# Patient Record
Sex: FEMALE | Race: OTHER | HISPANIC OR LATINO | Employment: FULL TIME | URBAN - METROPOLITAN AREA
[De-identification: names, ages, dates, MRNs, and addresses within clinical notes are randomized per-mention and may not be internally consistent; named-entity substitution may affect disease eponyms.]

---

## 2018-02-07 ENCOUNTER — OFFICE VISIT (OUTPATIENT)
Dept: OBGYN CLINIC | Facility: CLINIC | Age: 33
End: 2018-02-07
Payer: COMMERCIAL

## 2018-02-07 VITALS
WEIGHT: 139 LBS | DIASTOLIC BLOOD PRESSURE: 73 MMHG | SYSTOLIC BLOOD PRESSURE: 109 MMHG | HEIGHT: 67 IN | BODY MASS INDEX: 21.82 KG/M2

## 2018-02-07 DIAGNOSIS — Z30.433 ENCOUNTER FOR REMOVAL AND REINSERTION OF IUD: Primary | ICD-10-CM

## 2018-02-07 LAB — SL AMB POCT URINE HCG: NORMAL

## 2018-02-07 PROCEDURE — 58300 INSERT INTRAUTERINE DEVICE: CPT | Performed by: OBSTETRICS & GYNECOLOGY

## 2018-02-07 PROCEDURE — 58301 REMOVE INTRAUTERINE DEVICE: CPT | Performed by: OBSTETRICS & GYNECOLOGY

## 2018-02-07 PROCEDURE — 81025 URINE PREGNANCY TEST: CPT | Performed by: OBSTETRICS & GYNECOLOGY

## 2018-02-07 PROCEDURE — 99203 OFFICE O/P NEW LOW 30 MIN: CPT | Performed by: OBSTETRICS & GYNECOLOGY

## 2018-02-07 PROCEDURE — 84703 CHORIONIC GONADOTROPIN ASSAY: CPT | Performed by: OBSTETRICS & GYNECOLOGY

## 2018-02-07 NOTE — PROGRESS NOTES
Iud insertions  Date/Time: 2/7/2018 4:39 PM  Performed by: Tammie King  Authorized by: Tammie King     Consent:     Consent obtained:  Written    Consent given by:  Patient    Procedure risks and benefits discussed: yes      Patient questions answered: yes      Patient agrees, verbalizes understanding, and wants to proceed: yes      Instructions and paperwork completed: yes    Universal protocol:     Patient states understanding of procedure being performed: yes      Relevant documents present and verified: yes (Prior Mirena card reviewed)      Required blood products, implants, devices, and special equipment available: yes    Procedure:     Pelvic exam performed: yes      Negative urine pregnancy test: yes      Cervix cleaned and prepped: yes (betadine x3)      Speculum placed in vagina: yes      Tenaculum applied to cervix: yes      IUD inserted with no complications: yes      IUD type:  Mirena (Lot No: Viktoria Mark)    Strings trimmed: yes      Uterus sounded to confirm IUD placement: no      Uterus sound depth (cm):  7  Post-procedure:     Patient tolerated procedure well: yes

## 2018-02-07 NOTE — PROGRESS NOTES
Iud removal  Date/Time: 2018 4:38 PM  Performed by: Bell Villegas  Authorized by: Bell Villegas     Consent:     Consent obtained:  Written    Consent given by:  Patient    Procedure risks and benefits discussed: yes      Patient questions answered: yes      Patient agrees, verbalizes understanding, and wants to proceed: yes      Instructions and paperwork completed: yes    Universal protocol:     Patient states understanding of procedure being performed: yes      Relevant documents present and verified: yes (reviewed patient's Mirena card)      Required blood products, implants, devices, and special equipment available: yes    Procedure:     Removed with no complications: yes      Other reason for removal:   IUD

## 2018-02-07 NOTE — PROGRESS NOTES
Assessment:  33 y o  Ayla Stacy presenting for removal and reinsertion of Mirena IUD due to  IUD  Completed without difficulty  Plan:  Diagnoses and all orders for this visit:    Encounter for removal and reinsertion of IUD  -     levonorgestrel (MIRENA) 20 MCG/24HR IUD; by Intrauterine route once   -     POCT urine HCG  -     Return to office in 2 mo for annual and string check          __________________________________________________________________    Subjective   Paul David is a 33 y o  Ayla Caroy with amenorrhea who is sexually active and currently using contraception (Mirena) presenting for removal and reinsertion of Mirena IUD due to  device  She is without complaints  Patient reports she had the Mirena inserted by [facility] in 2012  She notes since this time, her periods have been largely absent  She sometimes gets spotting, but rarely  She does not have cramping  She is very satisfied with this method of contraception and wishes to continue with it  The patient has no complaints today  She denies fever/chills, abdominal pains, vaginal discharge, vaginal or labial itching/irritation, or urinary complaints  Reviewed with patient expected outcomes and bleeding patterns  Reviewed risks and benefits of Mirena insertion  The risks include, but are not limited to, bleeding, infection, injury to surrounding structures, uterine perforation, expulsion, failure resulting in pregnancy, and the higher relative risk of ectopic pregnancy  Reviewed risk of inability to remove her old Mirena in office  Patient expressed understanding and had no additional questions  The following portions of the patient's history were reviewed and updated as appropriate: allergies, current medications, past family history, past medical history, past social history, past surgical history and problem list     Review of Systems  Pertinent items are noted in HPI         Objective  /73  5' 7" (1 702 m)   Wt 63 kg (139 lb)   Breastfeeding? No Comment: iud 12/2012  BMI 21 77 kg/m²      Physical Exam:  Physical Exam   Constitutional: She is oriented to person, place, and time  She appears well-developed and well-nourished  No distress  HENT:   Head: Normocephalic and atraumatic  Eyes: No scleral icterus  Cardiovascular: Normal rate  Pulmonary/Chest: Effort normal  No accessory muscle usage  No respiratory distress  Genitourinary: There is no rash, tenderness, lesion or injury on the right labia  There is no rash, tenderness, lesion or injury on the left labia  Uterus is not enlarged, not fixed (anteverted, sounds to 7cm) and not tender  Cervix exhibits no motion tenderness, no discharge and no friability  No erythema, tenderness or bleeding in the vagina  No signs of injury around the vagina  No vaginal discharge found  Musculoskeletal: She exhibits no edema  Neurological: She is alert and oriented to person, place, and time  Skin: Skin is warm and dry  No rash noted  No erythema  Psychiatric: She has a normal mood and affect   Her behavior is normal          Lab Review  Labs: HCG - qualitative: NEG

## 2018-02-08 NOTE — PROGRESS NOTES
I have reviewed the notes, assessments, and/or procedures performed by Dr Bridget Trinh, I concur with her/his documentation of Santiago Valverde

## 2018-04-11 ENCOUNTER — OFFICE VISIT (OUTPATIENT)
Dept: OBGYN CLINIC | Facility: CLINIC | Age: 33
End: 2018-04-11
Payer: COMMERCIAL

## 2018-04-11 VITALS
WEIGHT: 140 LBS | SYSTOLIC BLOOD PRESSURE: 114 MMHG | HEIGHT: 67 IN | DIASTOLIC BLOOD PRESSURE: 71 MMHG | BODY MASS INDEX: 21.97 KG/M2

## 2018-04-11 DIAGNOSIS — Z30.431 IUD CHECK UP: ICD-10-CM

## 2018-04-11 DIAGNOSIS — N76.0 BACTERIAL VAGINOSIS: Primary | ICD-10-CM

## 2018-04-11 DIAGNOSIS — B96.89 BACTERIAL VAGINOSIS: Primary | ICD-10-CM

## 2018-04-11 LAB
BV WHIFF TEST VAG QL: POSITIVE
CLUE CELLS SPEC QL WET PREP: POSITIVE
PH SMN: 5.5 [PH]
SL AMB POCT WET MOUNT: ABNORMAL
T VAGINALIS RRNA SPEC QL NAA+PROBE: NEGATIVE
YEAST VAG QL WET PREP: NEGATIVE

## 2018-04-11 PROCEDURE — 87210 SMEAR WET MOUNT SALINE/INK: CPT | Performed by: NURSE PRACTITIONER

## 2018-04-11 PROCEDURE — 99213 OFFICE O/P EST LOW 20 MIN: CPT | Performed by: NURSE PRACTITIONER

## 2018-04-11 RX ORDER — METRONIDAZOLE 500 MG/1
500 TABLET ORAL EVERY 12 HOURS SCHEDULED
Qty: 14 TABLET | Refills: 0 | Status: SHIPPED | OUTPATIENT
Start: 2018-04-11 | End: 2018-04-18

## 2018-04-11 NOTE — PROGRESS NOTES
Assessment/Plan:         Diagnoses and all orders for this visit:    Bacterial vaginosis  -     metroNIDAZOLE (FLAGYL) 500 mg tablet; Take 1 tablet (500 mg total) by mouth every 12 (twelve) hours for 7 days  -     POCT wet mount    IUD check up    Other orders  -     levonorgestrel (MIRENA) 20 MCG/24HR IUD; 20 mcg/day      Plan  Take Metronidazole as directed '  Call with needs or concerns  Schedule annual GYN exam  Pt verbalized understanding of all discussed  Subjective:      Patient ID: Cornell Morse is a 33 y o  female  HPI   Pt had a new Mirena inserted 1 month ago presents for IUD check  Notes intermittent light pink and dark blood  Also notes an intermittent odor  Explained wet mount was positive for BV, safe and effective use of Metronidazole provided    The following portions of the patient's history were reviewed and updated as appropriate: allergies, current medications, past family history, past medical history, past social history, past surgical history and problem list     Review of Systems    Negative except for intermittent vaginal odor    Objective:      /71   Ht 5' 7" (1 702 m)   Wt 63 5 kg (140 lb)   BMI 21 93 kg/m²          Physical Exam    Alert and orinted  Vulva negative lesions or erythema  Vagina small amt   of dark blood  Cervix small amount of dark blood  Wet positive for BV

## 2018-04-24 ENCOUNTER — OFFICE VISIT (OUTPATIENT)
Dept: OBGYN CLINIC | Facility: CLINIC | Age: 33
End: 2018-04-24
Payer: COMMERCIAL

## 2018-04-24 VITALS
DIASTOLIC BLOOD PRESSURE: 69 MMHG | SYSTOLIC BLOOD PRESSURE: 105 MMHG | HEART RATE: 75 BPM | WEIGHT: 138 LBS | HEIGHT: 67 IN | BODY MASS INDEX: 21.66 KG/M2

## 2018-04-24 DIAGNOSIS — Z01.419 ENCOUNTER FOR ANNUAL ROUTINE GYNECOLOGICAL EXAMINATION: Primary | ICD-10-CM

## 2018-04-24 PROCEDURE — G0124 SCREEN C/V THIN LAYER BY MD: HCPCS | Performed by: PATHOLOGY

## 2018-04-24 PROCEDURE — 99395 PREV VISIT EST AGE 18-39: CPT | Performed by: NURSE PRACTITIONER

## 2018-04-24 PROCEDURE — 87624 HPV HI-RISK TYP POOLED RSLT: CPT | Performed by: NURSE PRACTITIONER

## 2018-04-24 PROCEDURE — G0145 SCR C/V CYTO,THINLAYER,RESCR: HCPCS | Performed by: PATHOLOGY

## 2018-04-24 NOTE — PROGRESS NOTES
Assessment             Plan      All questions answered  Breast self exam technique reviewed and patient encouraged to perform self-exam monthly  Contraception: IUD  Discussed healthy lifestyle modifications  Follow up in 1 year  Thin prep Pap smear  PAP results will be available in 2 weeks  Call with needs or concerns  Return in 1 year  Pt verbalized understanding of all discussed  Giovana Pelayo is a 33 y o  female who presents for annual exam  Periods are not occurring with Mirena, lasting 0 days  Dysmenorrhea:none  Cyclic symptoms include none  No intermenstrual bleeding, spotting, or discharge  The patient reports that there is not domestic violence in her life  1 partner x 4 years  Current contraception: IUD  History of abnormal Pap smear: no, last was > 2 years ago  Family history of uterine or ovarian cancer: no  Regular self breast exam: no discussed importance  History of abnormal mammogram: N/A  Family history of breast cancer: no  History of abnormal lipids: unknown  Menstrual History:  OB History      Para Term  AB Living    1 1 1     1    SAB TAB Ectopic Multiple Live Births            1         Menarche age: 16  No LMP recorded  Patient is not currently having periods (Reason: Birth Control)  Period Duration (Days): mirena    The following portions of the patient's history were reviewed and updated as appropriate: allergies, current medications, past family history, past medical history, past social history, past surgical history and problem list     Review of Systems  Pertinent items are noted in HPI        Objective      /69   Pulse 75   Ht 5' 7" (1 702 m)   Wt 62 6 kg (138 lb)   BMI 21 61 kg/m²     General:   alert and oriented, in no acute distress, alert, appears stated age and cooperative   Heart: regular rate and rhythm, S1, S2 normal, no murmur, click, rub or gallop   Lungs: clear to auscultation bilaterally   Thyroid negative masses Abdomen: soft, non-tender, without masses or organomegaly   Vulva: normal   Vagina: normal mucosa   Cervix: no bleeding following Pap, no cervical motion tenderness and no lesions, IUD string noted at cervical os   Uterus: normal size, normal shape, normal consistency  Brreasts   Adnexa: normal adnexa   Breasts NT, negative discharge, dimpling or masses

## 2018-04-27 LAB — HPV RRNA GENITAL QL NAA+PROBE: NORMAL

## 2018-05-01 ENCOUNTER — TELEPHONE (OUTPATIENT)
Dept: OBGYN CLINIC | Facility: CLINIC | Age: 33
End: 2018-05-01

## 2018-05-01 PROBLEM — R87.612 LGSIL ON PAP SMEAR OF CERVIX: Status: ACTIVE | Noted: 2018-05-01

## 2018-05-01 LAB
LAB AP GYN PRIMARY INTERPRETATION: NORMAL
Lab: NORMAL
PATH INTERP SPEC-IMP: NORMAL

## 2018-05-01 NOTE — TELEPHONE ENCOUNTER
Spoke to patient on the phone regarding pap results, she is to come back in one year for a repeat pap

## 2018-06-13 ENCOUNTER — OFFICE VISIT (OUTPATIENT)
Dept: OBGYN CLINIC | Facility: CLINIC | Age: 33
End: 2018-06-13

## 2018-06-13 VITALS
DIASTOLIC BLOOD PRESSURE: 66 MMHG | HEIGHT: 67 IN | HEART RATE: 56 BPM | WEIGHT: 137 LBS | SYSTOLIC BLOOD PRESSURE: 101 MMHG | BODY MASS INDEX: 21.5 KG/M2

## 2018-06-13 DIAGNOSIS — B96.89 BACTERIAL VAGINOSIS: Primary | ICD-10-CM

## 2018-06-13 DIAGNOSIS — N76.0 BACTERIAL VAGINOSIS: Primary | ICD-10-CM

## 2018-06-13 PROCEDURE — 87210 SMEAR WET MOUNT SALINE/INK: CPT | Performed by: NURSE PRACTITIONER

## 2018-06-13 PROCEDURE — 99214 OFFICE O/P EST MOD 30 MIN: CPT | Performed by: NURSE PRACTITIONER

## 2018-06-13 RX ORDER — METRONIDAZOLE 500 MG/1
500 TABLET ORAL EVERY 12 HOURS SCHEDULED
Qty: 14 TABLET | Refills: 0 | Status: SHIPPED | OUTPATIENT
Start: 2018-06-13 | End: 2018-06-20

## 2018-06-13 NOTE — PATIENT INSTRUCTIONS
Take Metronidazole as directed   Annual exam PAP with HPV co-testing is due 4/2019  Call wt needs or concerns

## 2018-06-13 NOTE — PROGRESS NOTES
Assessment/Plan:  1  Bacterial vaginosis  POCT wet mount    metroNIDAZOLE (FLAGYL) 500 mg tablet          Diagnoses and all orders for this visit:    Bacterial vaginosis  -     POCT wet mount  -     metroNIDAZOLE (FLAGYL) 500 mg tablet; Take 1 tablet (500 mg total) by mouth every 12 (twelve) hours for 7 days      Plan  Take Metronidazole as directed   Annual exam PAP with HPV co-testing is due 4/2019  Call [facility] needs or concerns  Pt verbalized understanding of all discussed  Subjective:      Patient ID: Jerzy Rios is a 33 y o  female  HPI   Pt presents today with C/O vaginal discharge with an odor x several days  1 partner x 4 years    Explained wet mount was positive for BV, safe and effective use of Metronidazole provided    The following portions of the patient's history were reviewed and updated as appropriate: allergies, current medications, past family history, past medical history, past social history, past surgical history and problem list     Review of Systems    Negative except for vaginal discharge with an odor    Objective:      /66   Pulse 56   Ht 5' 6 53" (1 69 m)   Wt 62 1 kg (137 lb)   BMI 21 76 kg/m²          Physical Exam    Vulva negative lesions or erythema  Vagina positive thin gray discharge  Cervix negative lesionspositive tin gray discharge  Wet mount positive for BV

## 2019-04-16 ENCOUNTER — TELEPHONE (OUTPATIENT)
Dept: OBGYN CLINIC | Facility: CLINIC | Age: 34
End: 2019-04-16

## 2019-05-10 ENCOUNTER — TELEPHONE (OUTPATIENT)
Dept: OBGYN CLINIC | Facility: CLINIC | Age: 34
End: 2019-05-10

## 2021-09-09 ENCOUNTER — APPOINTMENT (EMERGENCY)
Dept: RADIOLOGY | Facility: HOSPITAL | Age: 36
End: 2021-09-09

## 2021-09-09 ENCOUNTER — HOSPITAL ENCOUNTER (EMERGENCY)
Facility: HOSPITAL | Age: 36
Discharge: HOME | End: 2021-09-09
Attending: EMERGENCY MEDICINE | Admitting: EMERGENCY MEDICINE

## 2021-09-09 VITALS
SYSTOLIC BLOOD PRESSURE: 136 MMHG | RESPIRATION RATE: 18 BRPM | TEMPERATURE: 99 F | OXYGEN SATURATION: 94 % | BODY MASS INDEX: 22.72 KG/M2 | DIASTOLIC BLOOD PRESSURE: 66 MMHG | WEIGHT: 143.08 LBS | HEART RATE: 113 BPM

## 2021-09-09 DIAGNOSIS — J06.9 UPPER RESPIRATORY INFECTION: Primary | ICD-10-CM

## 2021-09-09 LAB — SARS-COV-2 RNA RESP QL NAA+PROBE: NEGATIVE

## 2021-09-09 PROCEDURE — 71045 X-RAY EXAM CHEST 1 VIEW: CPT

## 2021-09-09 PROCEDURE — U0003 INFECTIOUS AGENT DETECTION BY NUCLEIC ACID (DNA OR RNA); SEVERE ACUTE RESPIRATORY SYNDROME CORONAVIRUS 2 (SARS-COV-2) (CORONAVIRUS DISEASE [COVID-19]), AMPLIFIED PROBE TECHNIQUE, MAKING USE OF HIGH THROUGHPUT TECHNOLOGIES AS DESCRIBED BY CMS-2020-01-R: HCPCS | Performed by: EMERGENCY MEDICINE

## 2021-09-09 PROCEDURE — 99283 EMERGENCY DEPT VISIT LOW MDM: CPT

## 2021-09-09 PROCEDURE — 99283 EMERGENCY DEPT VISIT LOW MDM: CPT | Performed by: EMERGENCY MEDICINE

## 2021-09-09 PROCEDURE — U0005 INFEC AGEN DETEC AMPLI PROBE: HCPCS | Performed by: EMERGENCY MEDICINE

## 2021-09-09 RX ORDER — ALBUTEROL SULFATE 90 UG/1
2 AEROSOL, METERED RESPIRATORY (INHALATION) ONCE
Status: COMPLETED | OUTPATIENT
Start: 2021-09-09 | End: 2021-09-09

## 2021-09-09 RX ORDER — FEXOFENADINE HCL AND PSEUDOEPHEDRINE HCI 60; 120 MG/1; MG/1
1 TABLET, EXTENDED RELEASE ORAL EVERY 12 HOURS
Qty: 20 TABLET | Refills: 0 | Status: SHIPPED | OUTPATIENT
Start: 2021-09-09

## 2021-09-09 RX ORDER — IBUPROFEN 600 MG/1
600 TABLET ORAL ONCE
Status: COMPLETED | OUTPATIENT
Start: 2021-09-09 | End: 2021-09-09

## 2021-09-09 RX ORDER — FLUTICASONE PROPIONATE 50 MCG
1 SPRAY, SUSPENSION (ML) NASAL DAILY
Qty: 16 G | Refills: 0 | Status: SHIPPED | OUTPATIENT
Start: 2021-09-09

## 2021-09-09 RX ORDER — IBUPROFEN 600 MG/1
600 TABLET ORAL EVERY 6 HOURS PRN
Qty: 20 TABLET | Refills: 0 | Status: SHIPPED | OUTPATIENT
Start: 2021-09-09 | End: 2022-01-10 | Stop reason: SDUPTHER

## 2021-09-09 RX ADMIN — IBUPROFEN 600 MG: 600 TABLET, FILM COATED ORAL at 17:30

## 2021-09-09 RX ADMIN — ALBUTEROL SULFATE 2 PUFF: 90 AEROSOL, METERED RESPIRATORY (INHALATION) at 17:31

## 2021-09-09 NOTE — Clinical Note
Luz Maria Leon was seen and treated in our emergency department on 9/9/2021  Diagnosis:     Martha    She may return on this date:     [address] TEST RESULT  PATIENT MAY RETURN TO WORK/SCHOOL WHEN PATIENT IS 3 DAYS WITHOUT SYMPTOMS  If you have any questions or concerns, please don't hesitate to call        Oswaldo Calzada PA-C    ______________________________           _______________          _______________  Hospital Representative                              Date                                Time

## 2021-09-09 NOTE — ED PROVIDER NOTES
History  Chief Complaint   Patient presents with    Cough     cough, fever, states breathing "not normal" symptoms started last night  denies sick contacts   80-year-old female with cough, fever and feeling shortness of breath since last night  No sick contacts  Prior to Admission Medications   Prescriptions Last Dose Informant Patient Reported? Taking?   levonorgestrel (MIRENA) 20 MCG/24HR IUD   Yes No   Si mcg/day see administration instructions        Facility-Administered Medications: None       Past Medical History:   Diagnosis Date    Bacterial vaginosis 2018       History reviewed  No pertinent surgical history  Family History   Problem Relation Age of Onset    Diabetes Mother     No Known Problems Father      I have reviewed and agree with the history as documented  E-Cigarette/Vaping    E-Cigarette Use Never User      E-Cigarette/Vaping Substances     Social History     Tobacco Use    Smoking status: Never Smoker    Smokeless tobacco: Never Used   Vaping Use    Vaping Use: Never used   Substance Use Topics    Alcohol use: Yes     Comment: socially    Drug use: No       Review of Systems   Constitutional: Positive for fever  Negative for appetite change and fatigue  HENT: Negative for rhinorrhea and sore throat  Eyes: Negative for pain  Respiratory: Positive for cough and shortness of breath  Cardiovascular: Negative for chest pain and leg swelling  Gastrointestinal: Negative for abdominal pain, diarrhea and vomiting  Genitourinary: Negative for dysuria and flank pain  Musculoskeletal: Negative for back pain and neck pain  Skin: Negative for rash  Neurological: Negative for syncope and headaches  Psychiatric/Behavioral:        Mood normal       Physical Exam  Physical Exam  Vitals and nursing note reviewed  Constitutional:       Appearance: She is well-developed  HENT:      Head: Normocephalic and atraumatic     Eyes:      Pupils: Pupils are equal, round, and reactive to light  Cardiovascular:      Rate and Rhythm: Normal rate and regular rhythm  Pulmonary:      Effort: Pulmonary effort is normal  No respiratory distress  Breath sounds: Normal breath sounds  No wheezing  Abdominal:      Palpations: Abdomen is soft  Tenderness: There is no abdominal tenderness  Musculoskeletal:         General: Normal range of motion  Cervical back: Normal range of motion and neck supple  Skin:     General: Skin is warm and dry  Neurological:      Mental Status: She is alert and oriented to person, place, and time  Vital Signs  ED Triage Vitals [09/09/21 1439]   Temperature Pulse Respirations Blood Pressure SpO2   99 °F (37 2 °C) (!) 113 18 136/66 94 %      Temp Source Heart Rate Source Patient Position - Orthostatic VS BP Location FiO2 (%)   Oral Monitor -- Right arm --      Pain Score       No Pain           Vitals:    09/09/21 1439   BP: 136/66   Pulse: (!) 113         Visual Acuity      ED Medications  Medications   albuterol (PROVENTIL HFA,VENTOLIN HFA) inhaler 2 puff (2 puffs Inhalation Given 9/9/21 1731)   ibuprofen (MOTRIN) tablet 600 mg (600 mg Oral Given 9/9/21 1730)       Diagnostic Studies  Results Reviewed     Procedure Component Value Units Date/Time    Novel Coronavirus (Covid-19),PCR [facility] - 24 Hour Routine [[id number]]  (Normal) Collected: 09/09/21 1730    Lab Status: Final result Specimen: Nares from Nose Updated: 09/09/21 1858     SARS-CoV-2 Negative    Narrative: The specimen collection materials, transport medium, and/or testing methodology utilized in the production of these test results have been proven to be reliable in a limited validation with an abbreviated program under the Emergency Utilization Authorization provided by the FDA  Testing reported as "Presumptive positive" will be confirmed with secondary testing to ensure result accuracy    Clinical caution and judgement should be used with the interpretation of these results with consideration of the clinical impression and other laboratory testing  Testing reported as "Positive" or "Negative" has been proven to be accurate according to standard laboratory validation requirements  All testing is performed with control materials showing appropriate reactivity at standard intervals  XR chest portable    (Results Pending)              Procedures  Procedures         ED Course                             SBIRT 20yo+      Most Recent Value   SBIRT (22 yo +)   In order to provide better care to our patients, we are screening all of our patients for alcohol and drug use  Would it be okay to ask you these screening questions? No Filed at: 09/09/2021 1652                    [facility]  Number of Diagnoses or Management Options     Amount and/or Complexity of Data Reviewed  Tests in the radiology section of CPT®: ordered and reviewed    Risk of Complications, Morbidity, and/or Mortality  Presenting problems: moderate  General comments: Chest x-ray NAD    Pulse ox 95% room air    Patient stable for outpatient follow-up        Disposition  Final diagnoses:   Upper respiratory infection     Time reflects when diagnosis was documented in both MDM as applicable and the Disposition within this note     Time User Action Codes Description Comment    9/9/2021  6:22 PM Archana Lai [J06 9] Upper respiratory infection       ED Disposition     ED Disposition Condition Date/Time Comment    Discharge Stable Thu Sep 9, 2021  6:22 PM Nabor Medina discharge to home/self care              Follow-up Information     Follow up With Specialties Details Why Contact Juuj Diego DO Family Medicine   [address]  [phone]            Discharge Medication List as of 9/9/2021  6:24 PM      START taking these medications    Details   fexofenadine-pseudoephedrine (ALLEGRA-D)  MG per tablet Take 1 tablet by mouth every 12 (twelve) hours, Starting Thu 9/9/2021, Normal      fluticasone (FLONASE) 50 mcg/act nasal spray 1 spray into each nostril daily, Starting Thu 9/9/2021, Normal      ibuprofen (MOTRIN) 600 mg tablet Take 1 tablet (600 mg total) by mouth every 6 (six) hours as needed for mild pain, Starting Thu 9/9/2021, Normal         CONTINUE these medications which have NOT CHANGED    Details   levonorgestrel (MIRENA) 20 MCG/24HR IUD 20 mcg/day see administration instructions  , Starting Thu 12/13/2018, Until Tue 4/11/2023, Historical Med           No discharge procedures on file      PDMP Review     None          ED Provider  Electronically Signed by           Sandoval Phelan MD  09/09/21 [id number]

## 2021-09-13 NOTE — ED NOTES
Pt requesting result   Result printed and will be available for pt to pickup from registration staff     Tyler Graham RN  09/13/21 [id number]

## 2022-01-10 ENCOUNTER — APPOINTMENT (EMERGENCY)
Dept: RADIOLOGY | Facility: HOSPITAL | Age: 37
End: 2022-01-10
Payer: COMMERCIAL

## 2022-01-10 ENCOUNTER — HOSPITAL ENCOUNTER (EMERGENCY)
Facility: HOSPITAL | Age: 37
Discharge: HOME | End: 2022-01-10
Attending: EMERGENCY MEDICINE
Payer: COMMERCIAL

## 2022-01-10 VITALS
HEART RATE: 96 BPM | OXYGEN SATURATION: 99 % | DIASTOLIC BLOOD PRESSURE: 96 MMHG | RESPIRATION RATE: 18 BRPM | SYSTOLIC BLOOD PRESSURE: 146 MMHG | WEIGHT: 147.05 LBS | BODY MASS INDEX: 23.35 KG/M2 | TEMPERATURE: 98.5 F

## 2022-01-10 DIAGNOSIS — M54.50 ACUTE LOW BACK PAIN: Primary | ICD-10-CM

## 2022-01-10 DIAGNOSIS — J06.9 UPPER RESPIRATORY INFECTION: ICD-10-CM

## 2022-01-10 LAB
BACTERIA UR QL AUTO: ABNORMAL /HPF
BILIRUB UR QL STRIP: NEGATIVE
CLARITY UR: CLEAR
COLOR UR: YELLOW
EXT PREG TEST URINE: NEGATIVE
EXT. CONTROL ED NAV: NORMAL
GLUCOSE UR STRIP-MCNC: NEGATIVE MG/DL
HGB UR QL STRIP.AUTO: ABNORMAL
KETONES UR STRIP-MCNC: ABNORMAL MG/DL
LEUKOCYTE ESTERASE UR QL STRIP: NEGATIVE
NITRITE UR QL STRIP: NEGATIVE
NON-SQ EPI CELLS URNS QL MICRO: ABNORMAL /HPF
PH UR STRIP.AUTO: 5.5 [PH] (ref 4.5–8)
PROT UR STRIP-MCNC: NEGATIVE MG/DL
RBC #/AREA URNS AUTO: ABNORMAL /HPF
SP GR UR STRIP.AUTO: >=1.03 (ref 1–1.03)
UROBILINOGEN UR QL STRIP.AUTO: 0.2 E.U./DL
WBC #/AREA URNS AUTO: ABNORMAL /HPF

## 2022-01-10 PROCEDURE — 81001 URINALYSIS AUTO W/SCOPE: CPT

## 2022-01-10 PROCEDURE — 81025 URINE PREGNANCY TEST: CPT | Performed by: PHYSICIAN ASSISTANT

## 2022-01-10 PROCEDURE — 72100 X-RAY EXAM L-S SPINE 2/3 VWS: CPT

## 2022-01-10 PROCEDURE — 99283 EMERGENCY DEPT VISIT LOW MDM: CPT

## 2022-01-10 PROCEDURE — 99285 EMERGENCY DEPT VISIT HI MDM: CPT | Performed by: PHYSICIAN ASSISTANT

## 2022-01-10 RX ORDER — METHOCARBAMOL 500 MG/1
500 TABLET, FILM COATED ORAL 4 TIMES DAILY
Qty: 40 TABLET | Refills: 0 | Status: SHIPPED | OUTPATIENT
Start: 2022-01-10 | End: 2022-01-20

## 2022-01-10 RX ORDER — IBUPROFEN 600 MG/1
600 TABLET ORAL EVERY 6 HOURS PRN
Qty: 20 TABLET | Refills: 0 | Status: SHIPPED | OUTPATIENT
Start: 2022-01-10

## 2022-01-10 RX ORDER — LIDOCAINE AND PRILOCAINE 25; 25 MG/G; MG/G
CREAM TOPICAL AS NEEDED
Qty: 30 G | Refills: 0 | Status: SHIPPED | OUTPATIENT
Start: 2022-01-10

## 2022-01-10 RX ORDER — LIDOCAINE 50 MG/G
1 PATCH TOPICAL ONCE
Status: DISCONTINUED | OUTPATIENT
Start: 2022-01-10 | End: 2022-01-10 | Stop reason: HOSPADM

## 2022-01-10 RX ORDER — KETOROLAC TROMETHAMINE 30 MG/ML
15 INJECTION, SOLUTION INTRAMUSCULAR; INTRAVENOUS ONCE
Status: COMPLETED | OUTPATIENT
Start: 2022-01-10 | End: 2022-01-10

## 2022-01-10 RX ADMIN — KETOROLAC TROMETHAMINE 15 MG: 30 INJECTION, SOLUTION INTRAMUSCULAR; INTRAVENOUS at 13:15

## 2022-01-10 RX ADMIN — LIDOCAINE 1 PATCH: 50 PATCH CUTANEOUS at 13:15

## 2022-01-10 NOTE — ED PROVIDER NOTES
History  Chief Complaint   Patient presents with    Back Pain     Reports lower back pain  Denies radiation of pain  No pain medication pta  Low back pain started today at work stood up and then coughed and felt something pull having pain since  Denies chance of preg  No injury or trauma  No fevers or cough  Some pain to left leg  No numbness or tingling  No GI upset or change in bowel or bladder  Prior to Admission Medications   Prescriptions Last Dose Informant Patient Reported? Taking?   fexofenadine-pseudoephedrine (ALLEGRA-D)  MG per tablet   No No   Sig: Take 1 tablet by mouth every 12 (twelve) hours   fluticasone (FLONASE) 50 mcg/act nasal spray   No No   Si spray into each nostril daily   ibuprofen (MOTRIN) 600 mg tablet   No No   Sig: Take 1 tablet (600 mg total) by mouth every 6 (six) hours as needed for mild pain   ibuprofen (MOTRIN) 600 mg tablet   No No   Sig: Take 1 tablet (600 mg total) by mouth every 6 (six) hours as needed for mild pain   levonorgestrel (MIRENA) 20 MCG/24HR IUD   Yes No   Si mcg/day see administration instructions        Facility-Administered Medications: None       Past Medical History:   Diagnosis Date    Bacterial vaginosis 2018       History reviewed  No pertinent surgical history  Family History   Problem Relation Age of Onset    Diabetes Mother     No Known Problems Father      I have reviewed and agree with the history as documented  E-Cigarette/Vaping    E-Cigarette Use Never User      E-Cigarette/Vaping Substances     Social History     Tobacco Use    Smoking status: Never Smoker    Smokeless tobacco: Never Used   Vaping Use    Vaping Use: Never used   Substance Use Topics    Alcohol use: Yes     Comment: socially    Drug use: No       Review of Systems   All other systems reviewed and are negative  Physical Exam  Physical Exam  Vitals and nursing note reviewed  Constitutional:       Appearance: She is well-developed  HENT:      Head: Normocephalic and atraumatic  Right Ear: External ear normal       Left Ear: External ear normal    Eyes:      Conjunctiva/sclera: Conjunctivae normal    Cardiovascular:      Rate and Rhythm: Normal rate and regular rhythm  Heart sounds: Normal heart sounds  Pulmonary:      Effort: Pulmonary effort is normal       Breath sounds: Normal breath sounds  Abdominal:      General: Bowel sounds are normal       Palpations: Abdomen is soft  Musculoskeletal:      Cervical back: Neck supple  Lumbar back: Spasms present  Back:    Lymphadenopathy:      Cervical: No cervical adenopathy  Skin:     General: Skin is warm  Findings: No rash  Neurological:      Mental Status: She is alert  Psychiatric:         Behavior: Behavior normal          Vital Signs  ED Triage Vitals [01/10/22 1146]   Temperature Pulse Respirations Blood Pressure SpO2   98 5 °F (36 9 °C) 96 18 146/96 99 %      Temp Source Heart Rate Source Patient Position - Orthostatic VS BP Location FiO2 (%)   Oral Monitor Sitting Right arm --      Pain Score       7           Vitals:    01/10/22 1146   BP: 146/96   Pulse: 96   Patient Position - Orthostatic VS: Sitting         Visual Acuity      ED Medications  Medications   lidocaine (LIDODERM) 5 % patch 1 patch (1 patch Topical Medication Applied 1/10/22 1315)   ketorolac (TORADOL) injection 15 mg (15 mg Intramuscular Given 1/10/22 1315)       Diagnostic Studies  Results Reviewed     Procedure Component Value Units Date/Time    Urine Microscopic [[id number]] Collected: 01/10/22 1301    Lab Status:  In process Specimen: Urine, Clean Catch Updated: 01/10/22 1341    POCT pregnancy, urine [[id number]]  (Normal) Resulted: 01/10/22 1307    Lab Status: Final result Updated: 01/10/22 1307     EXT PREG TEST UR (Ref: Negative) negative     Control valid    Urine Macroscopic, POC [[id number]]  (Abnormal) Collected: 01/10/22 1301    Lab Status: Final result Specimen: Urine Updated: 01/10/22 1303     Color, UA Yellow     Clarity, UA Clear     pH, UA 5 5     Leukocytes, UA Negative     Nitrite, UA Negative     Protein, UA Negative mg/dl      Glucose, UA Negative mg/dl      Ketones, UA 15 (1+) mg/dl      Urobilinogen, UA 0 2 E U /dl      Bilirubin, UA Negative     Blood, UA Large     Specific Gravity, UA >=1 030    Narrative:      CLINITEK RESULT                 XR lumbar spine 2 or 3 views   ED Interpretation by Jose Luis Sadler PA-C (01/10 1325)   No acute pathology                  Procedures  Procedures         ED Course  ED Course as of 01/10/22 1346   Mon Hugo 10, 2022   1337 Discussed results with pt - she is just starting her mensual cycle - symptoms not suggestive of stone - pt agrees  Pain improving instructions reviewed  SBIRT 22yo+      Most Recent Value   SBIRT (24 yo +)    In order to provide better care to our patients, we are screening all of our patients for alcohol and drug use  Would it be okay to ask you these screening questions? No Filed at: 01/10/2022 1222                    MDM  Number of Diagnoses or Management Options  Acute low back pain: new and requires workup     Amount and/or Complexity of Data Reviewed  Clinical lab tests: reviewed  Independent visualization of images, tracings, or specimens: yes    Risk of Complications, Morbidity, and/or Mortality  General comments: Instructions reviewed       Patient Progress  Patient progress: improved      Disposition  Final diagnoses:   Acute low back pain     Time reflects when diagnosis was documented in both MDM as applicable and the Disposition within this note     Time User Action Codes Description Comment    1/10/2022  1:43 PM Xuan Erickson [M54 50] Acute low back pain     1/10/2022  1:43 PM Xuan Erickson [J06 9] Upper respiratory infection       ED Disposition     ED Disposition Condition Date/Time Comment    Discharge Stable Mon Hugo 10, 2022  1:43 PM Karyn Patrick discharge to home/self care  Follow-up Information     Follow up With Specialties Details Why Contact Info    Mellisa Lozano DO [facility]   [phone] [id number] [phone]            Patient's Medications   Discharge Prescriptions    LIDOCAINE-PRILOCAINE (EMLA) CREAM    Apply topically as needed for mild pain       Start Date: 1/10/2022 End Date: --       Order Dose: --       Quantity: 30 g    Refills: 0    METHOCARBAMOL (ROBAXIN) 500 MG TABLET    Take 1 tablet (500 mg total) by mouth 4 (four) times a day for 10 days       Start Date: 1/10/2022 End Date: 1/20/2022       Order Dose: 500 mg       Quantity: 40 tablet    Refills: 0       No discharge procedures on file      PDMP Review     None          ED Provider  Electronically Signed by           Mark Maguire PA-C  01/10/22 5592

## 2022-01-10 NOTE — Clinical Note
Santiago Valverde was seen and treated in our emergency department on 1/10/2022  Diagnosis:     Martha    She may return on this date: 01/13/2022         If you have any questions or concerns, please don't hesitate to call        Xuan Erickson PA-C    ______________________________           _______________          _______________  Hospital Representative                              Date                                Time

## 2023-10-18 ENCOUNTER — OFFICE VISIT (OUTPATIENT)
Dept: GASTROENTEROLOGY | Facility: CLINIC | Age: 38
End: 2023-10-18

## 2023-10-18 ENCOUNTER — TELEPHONE (OUTPATIENT)
Dept: GASTROENTEROLOGY | Facility: CLINIC | Age: 38
End: 2023-10-18

## 2023-10-18 VITALS
TEMPERATURE: 97.8 F | DIASTOLIC BLOOD PRESSURE: 70 MMHG | HEIGHT: 66 IN | SYSTOLIC BLOOD PRESSURE: 112 MMHG | WEIGHT: 148.4 LBS | BODY MASS INDEX: 23.85 KG/M2

## 2023-10-18 DIAGNOSIS — R19.4 CHANGE IN BOWEL HABIT: Primary | ICD-10-CM

## 2023-10-18 DIAGNOSIS — R10.13 EPIGASTRIC PAIN: ICD-10-CM

## 2023-10-18 DIAGNOSIS — R11.2 NAUSEA AND VOMITING IN ADULT: ICD-10-CM

## 2023-10-18 DIAGNOSIS — R14.0 BLOATING: ICD-10-CM

## 2023-10-18 RX ORDER — POLYETHYLENE GLYCOL 3350
17 POWDER (GRAM) MISCELLANEOUS DAILY
COMMUNITY
Start: 2023-06-26 | End: 2024-06-25

## 2023-10-18 RX ORDER — FAMOTIDINE 40 MG/1
40 TABLET, FILM COATED ORAL
Qty: 30 TABLET | Refills: 3 | Status: SHIPPED | OUTPATIENT
Start: 2023-10-18

## 2023-10-18 NOTE — PROGRESS NOTES
[facility] - Outpatient Consultation New Patient  Shruthi Garcia 41 y.o. female MRN: [id number]  Encounter: [id number]  ASSESSMENT AND PLAN:    1. Change in bowel habit  Patient with significant change in bowels, worsening constipation for years. No prior colonoscopy. She does have associated bloating which I suspect may be due to her constipation issues. We will check TSH. She reports recent normal routine labs with family physician, I do not have these results. Discussed and educated patient on the importance of a high-fiber diet. I encourage patient to drink at least 60 ounces of water a day. I recommended that she start MiraLAX powder over-the-counter once daily, not just as needed. Given significance of symptoms will plan for colonoscopy for further evaluation.    - TSH, 3rd generation with Free T4 reflex; Future  - Colonoscopy; Future  - polyethylene glycol (GOLYTELY) 4000 mL solution; Take 4,000 mL by mouth once for 1 dose Take as directed by office instructions prior to colonoscopy. Dispense: 4000 mL; Refill: 0    2. Epigastric pain  3. Nausea and vomiting in adult  4. Bloating  Patient also with worsening epigastric pain for 1 year with associated bloating, nausea, rare vomiting. Her weight has been stable. She denies reflux symptoms. She does take Excedrin on a regular basis. Expressed concern for possible NSAID induced gastritis/possible ulcer disease. I advised her to follow-up with her family doctor regarding ongoing headaches and to limit Excedrin use if able. She expressed understanding. We will start patient on famotidine 40 mg once daily at nighttime  Will order EGD with biopsy to r/o esophagitis/ gastritis/ H pylori/ PUD to be done at time of colonoscopy  Will check celiac panel      - EGD; Future  - famotidine (PEPCID) 40 MG tablet; Take 1 tablet (40 mg total) by mouth daily at bedtime  Dispense: 30 tablet;  Refill: 3  - Tissue transglutaminase, IgA; Future  - IgA; Future    Patient was instructed to call the office with any questions, concerns, new/ worsening/ persisting GI symptoms. Advised patient go to the ER with any severe or worsening abdominal pain, fevers/ chills, intractable N/V, chest pain, SOB, dizziness, lightheadedness, feeling something stuck in esophagus that will not go down. Patient expressed understanding and is in agreement with treatment plan. Will plan to follow up after diagnostic tests   ________________________________________________________    HPI:      Patient is new to me and our office. Patient presents as a new patient today to discuss constipation, abdominal pain, bloating, nausea. Patient complains of change in bowels, worsening constipation x several years. Patient can have a BM only ~ 2 x/ week. Prior to 3 years ago her Bms were much more regular. When she has a BM the stool is small in caliber, hard, she has to strain. She has tried miralax OTC only prn with no relief. She tried to increase fiber in her diet and water intake and no help. She also complains of epigastric abdominal pain x 1 year. Pain has been worsening. Pain is daily. She describes the pain as dull. Pain comes and goes. Pain actually can improve after eating. Pain is worse with oatmeal however. Pain is not related to BMs. Associated intermittent nausea with rare vomiting, excess gas, bloating. Patient denies any fevers/ chills, unintentional weight loss,  black or bloody stools, heartburn, acid reflux, dysphagia, odynophagia. Denies chest pain, SOB    Tobacco use- None  Alcohol use- None  NSAID use- She does take Excedrin several days a week for headaches   No prior EGD or colonoscopy   Denies FH of CRC or colon polyps     REVIEW OF SYSTEMS:    Review of Systems   Constitutional:  Positive for appetite change (decreased). Negative for chills and fever. HENT:  Negative for ear pain and sore throat.     Eyes:  Negative for pain and visual disturbance. Respiratory:  Negative for cough and shortness of breath. Cardiovascular:  Negative for chest pain and palpitations. Gastrointestinal:  Positive for abdominal pain, constipation, nausea and vomiting. Genitourinary:  Negative for dysuria and hematuria. Musculoskeletal:  Negative for arthralgias and back pain. Skin:  Negative for color change and rash. Neurological:  Negative for seizures and syncope. All other systems reviewed and are negative. Historical Information   Past Medical History:   Diagnosis Date    Bacterial vaginosis 04/2018     History reviewed. No pertinent surgical history.   Social History   Social History     Substance and Sexual Activity   Alcohol Use Yes    Comment: socially     Social History     Substance and Sexual Activity   Drug Use No     Social History     Tobacco Use   Smoking Status Never   Smokeless Tobacco Never     Family History   Problem Relation Age of Onset    Diabetes Mother     No Known Problems Father        Meds/Allergies       Current Outpatient Medications:     fexofenadine-pseudoephedrine (ALLEGRA-D)  MG per tablet    fluticasone (FLONASE) 50 mcg/act nasal spray    Polyethylene Glycol 3350 POWD    ibuprofen (MOTRIN) 600 mg tablet    lidocaine-prilocaine (EMLA) cream    methocarbamol (ROBAXIN) 500 mg tablet    Allergies   Allergen Reactions    Sesame Seed (Diagnostic) - Food Allergy Itching and Swelling     Pt reports throat swelling and rash to back and chest         Objective   Wt Readings from Last 3 Encounters:   10/18/23 67.3 kg (148 lb 6.4 oz)   01/10/22 66.7 kg (147 lb 0.8 oz)   09/09/21 64.9 kg (143 lb 1.3 oz)     Temp Readings from Last 3 Encounters:   10/18/23 97.8 °F (36.6 °C) (Tympanic)   01/10/22 98.5 °F (36.9 °C) (Oral)   09/09/21 99 °F (37.2 °C) (Oral)     BP Readings from Last 3 Encounters:   10/18/23 112/70   01/10/22 146/96   09/09/21 136/66     Pulse Readings from Last 3 Encounters:   01/10/22 96   09/09/21 (!) 113 06/13/18 56        PHYSICAL EXAM:     Physical Exam  Vitals reviewed. Constitutional:       General: She is not in acute distress. Appearance: She is not toxic-appearing. HENT:      Head: Normocephalic and atraumatic. Eyes:      Extraocular Movements: Extraocular movements intact. Conjunctiva/sclera: Conjunctivae normal.   Cardiovascular:      Rate and Rhythm: Normal rate and regular rhythm. Pulmonary:      Effort: Pulmonary effort is normal. No respiratory distress. Breath sounds: Normal breath sounds. Abdominal:      General: Bowel sounds are normal.      Palpations: Abdomen is soft. Tenderness: There is abdominal tenderness in the epigastric area. Musculoskeletal:         General: No swelling or tenderness. Cervical back: Normal range of motion and neck supple. Skin:     General: Skin is warm and dry. Coloration: Skin is not jaundiced. Neurological:      General: No focal deficit present. Mental Status: She is alert and oriented to person, place, and time. Mental status is at baseline. Psychiatric:         Mood and Affect: Mood normal.         Behavior: Behavior normal.         Thought Content:  Thought content normal.          Junie Garcia PA-C   Available on [facility]

## 2023-10-18 NOTE — PATIENT INSTRUCTIONS
High Fiber Diet   AMBULATORY CARE:   A high-fiber diet  includes foods that have a high amount of fiber. Fiber is the part of fruits, vegetables, and grains that is not broken down by your body. Fiber keeps your bowel movements regular. Fiber can also help lower your cholesterol level, control blood sugar in people with diabetes, and relieve constipation. Fiber can also help you control your weight because it helps you feel full faster. Most adults should eat 25 to 35 grams of fiber each day. Talk to your dietitian or healthcare provider about the amount of fiber you need. Good sources of fiber:       Foods with at least 4 grams of fiber per serving:      ? to ½ cup of high-fiber cereal (check the nutrition label on the box)    ½ cup of blackberries or raspberries    4 dried prunes    1 cooked artichoke    ½ cup of cooked legumes, such as lentils, or red, kidney, and fiore beans    Foods with 1 to 3 grams of fiber per servin slice of whole-wheat, pumpernickel, or rye bread    ½ cup of cooked brown rice    4 whole-wheat crackers    1 cup of oatmeal    ½ cup of cereal with 1 to 3 grams of fiber per serving (check the nutrition label on the box)    1 small piece of fruit, such as an apple, banana, pear, kiwi, or orange    3 dates    ½ cup of canned apricots, fruit cocktail, peaches, or pears    ½ cup of raw or cooked vegetables, such as carrots, cauliflower, cabbage, spinach, squash, or corn  Ways that you can increase fiber in your diet:   Choose brown or wild rice instead of white rice. Use whole wheat flour in recipes instead of white or all-purpose flour. Add beans and peas to casseroles or soups. Choose fresh fruit and vegetables with peels or skins on instead of juices. Other diet guidelines to follow: Add fiber to your diet slowly. You may have abdominal discomfort, bloating, and gas if you add fiber to your diet too quickly. Drink plenty of liquids as you add fiber to your diet. You may have nausea or develop constipation if you do not drink enough water. Ask how much liquid to drink each day and which liquids are best for you. © Copyright Lashell Velasquez 2023 Information is for End User's use only and may not be sold, redistributed or otherwise used for commercial purposes. The above information is an  only. It is not intended as medical advice for individual conditions or treatments. Talk to your doctor, nurse or pharmacist before following any medical regimen to see if it is safe and effective for you.   Scheduled date of EGD/colonoscopy (as of today):11.16.23  Physician performing EGD/colonoscopy:dr jacobson  [facility]  Desired bowel prep reviewed with patient:ibrahima  Instructions reviewed with patient by:vanessa  Clearances:  n/a

## 2023-10-18 NOTE — H&P (VIEW-ONLY)
[facility] - Outpatient Consultation New Patient  Unknown McCormick 41 y.o. female MRN: [id number]  Encounter: [id number]  ASSESSMENT AND PLAN:    1. Change in bowel habit  Patient with significant change in bowels, worsening constipation for years. No prior colonoscopy. She does have associated bloating which I suspect may be due to her constipation issues. We will check TSH. She reports recent normal routine labs with family physician, I do not have these results. Discussed and educated patient on the importance of a high-fiber diet. I encourage patient to drink at least 60 ounces of water a day. I recommended that she start MiraLAX powder over-the-counter once daily, not just as needed. Given significance of symptoms will plan for colonoscopy for further evaluation.    - TSH, 3rd generation with Free T4 reflex; Future  - Colonoscopy; Future  - polyethylene glycol (GOLYTELY) 4000 mL solution; Take 4,000 mL by mouth once for 1 dose Take as directed by office instructions prior to colonoscopy. Dispense: 4000 mL; Refill: 0    2. Epigastric pain  3. Nausea and vomiting in adult  4. Bloating  Patient also with worsening epigastric pain for 1 year with associated bloating, nausea, rare vomiting. Her weight has been stable. She denies reflux symptoms. She does take Excedrin on a regular basis. Expressed concern for possible NSAID induced gastritis/possible ulcer disease. I advised her to follow-up with her family doctor regarding ongoing headaches and to limit Excedrin use if able. She expressed understanding. We will start patient on famotidine 40 mg once daily at nighttime  Will order EGD with biopsy to r/o esophagitis/ gastritis/ H pylori/ PUD to be done at time of colonoscopy  Will check celiac panel      - EGD; Future  - famotidine (PEPCID) 40 MG tablet; Take 1 tablet (40 mg total) by mouth daily at bedtime  Dispense: 30 tablet;  Refill: 3  - Tissue transglutaminase, IgA; Future  - IgA; Future    Patient was instructed to call the office with any questions, concerns, new/ worsening/ persisting GI symptoms. Advised patient go to the ER with any severe or worsening abdominal pain, fevers/ chills, intractable N/V, chest pain, SOB, dizziness, lightheadedness, feeling something stuck in esophagus that will not go down. Patient expressed understanding and is in agreement with treatment plan. Will plan to follow up after diagnostic tests   ________________________________________________________    HPI:      Patient is new to me and our office. Patient presents as a new patient today to discuss constipation, abdominal pain, bloating, nausea. Patient complains of change in bowels, worsening constipation x several years. Patient can have a BM only ~ 2 x/ week. Prior to 3 years ago her Bms were much more regular. When she has a BM the stool is small in caliber, hard, she has to strain. She has tried miralax OTC only prn with no relief. She tried to increase fiber in her diet and water intake and no help. She also complains of epigastric abdominal pain x 1 year. Pain has been worsening. Pain is daily. She describes the pain as dull. Pain comes and goes. Pain actually can improve after eating. Pain is worse with oatmeal however. Pain is not related to BMs. Associated intermittent nausea with rare vomiting, excess gas, bloating. Patient denies any fevers/ chills, unintentional weight loss,  black or bloody stools, heartburn, acid reflux, dysphagia, odynophagia. Denies chest pain, SOB    Tobacco use- None  Alcohol use- None  NSAID use- She does take Excedrin several days a week for headaches   No prior EGD or colonoscopy   Denies FH of CRC or colon polyps     REVIEW OF SYSTEMS:    Review of Systems   Constitutional:  Positive for appetite change (decreased). Negative for chills and fever. HENT:  Negative for ear pain and sore throat.     Eyes:  Negative for pain and visual disturbance. Respiratory:  Negative for cough and shortness of breath. Cardiovascular:  Negative for chest pain and palpitations. Gastrointestinal:  Positive for abdominal pain, constipation, nausea and vomiting. Genitourinary:  Negative for dysuria and hematuria. Musculoskeletal:  Negative for arthralgias and back pain. Skin:  Negative for color change and rash. Neurological:  Negative for seizures and syncope. All other systems reviewed and are negative. Historical Information   Past Medical History:   Diagnosis Date    Bacterial vaginosis 04/2018     History reviewed. No pertinent surgical history.   Social History   Social History     Substance and Sexual Activity   Alcohol Use Yes    Comment: socially     Social History     Substance and Sexual Activity   Drug Use No     Social History     Tobacco Use   Smoking Status Never   Smokeless Tobacco Never     Family History   Problem Relation Age of Onset    Diabetes Mother     No Known Problems Father        Meds/Allergies       Current Outpatient Medications:     fexofenadine-pseudoephedrine (ALLEGRA-D)  MG per tablet    fluticasone (FLONASE) 50 mcg/act nasal spray    Polyethylene Glycol 3350 POWD    ibuprofen (MOTRIN) 600 mg tablet    lidocaine-prilocaine (EMLA) cream    methocarbamol (ROBAXIN) 500 mg tablet    Allergies   Allergen Reactions    Sesame Seed (Diagnostic) - Food Allergy Itching and Swelling     Pt reports throat swelling and rash to back and chest         Objective   Wt Readings from Last 3 Encounters:   10/18/23 67.3 kg (148 lb 6.4 oz)   01/10/22 66.7 kg (147 lb 0.8 oz)   09/09/21 64.9 kg (143 lb 1.3 oz)     Temp Readings from Last 3 Encounters:   10/18/23 97.8 °F (36.6 °C) (Tympanic)   01/10/22 98.5 °F (36.9 °C) (Oral)   09/09/21 99 °F (37.2 °C) (Oral)     BP Readings from Last 3 Encounters:   10/18/23 112/70   01/10/22 146/96   09/09/21 136/66     Pulse Readings from Last 3 Encounters:   01/10/22 96   09/09/21 (!) 113 06/13/18 56        PHYSICAL EXAM:     Physical Exam  Vitals reviewed. Constitutional:       General: She is not in acute distress. Appearance: She is not toxic-appearing. HENT:      Head: Normocephalic and atraumatic. Eyes:      Extraocular Movements: Extraocular movements intact. Conjunctiva/sclera: Conjunctivae normal.   Cardiovascular:      Rate and Rhythm: Normal rate and regular rhythm. Pulmonary:      Effort: Pulmonary effort is normal. No respiratory distress. Breath sounds: Normal breath sounds. Abdominal:      General: Bowel sounds are normal.      Palpations: Abdomen is soft. Tenderness: There is abdominal tenderness in the epigastric area. Musculoskeletal:         General: No swelling or tenderness. Cervical back: Normal range of motion and neck supple. Skin:     General: Skin is warm and dry. Coloration: Skin is not jaundiced. Neurological:      General: No focal deficit present. Mental Status: She is alert and oriented to person, place, and time. Mental status is at baseline. Psychiatric:         Mood and Affect: Mood normal.         Behavior: Behavior normal.         Thought Content:  Thought content normal.          Saima Kilpatrick PA-C   Available on [facility]

## 2023-10-31 ENCOUNTER — ANESTHESIA EVENT (OUTPATIENT)
Dept: ANESTHESIOLOGY | Facility: HOSPITAL | Age: 38
End: 2023-10-31

## 2023-10-31 ENCOUNTER — ANESTHESIA (OUTPATIENT)
Dept: ANESTHESIOLOGY | Facility: HOSPITAL | Age: 38
End: 2023-10-31

## 2023-11-15 ENCOUNTER — TELEPHONE (OUTPATIENT)
Dept: GASTROENTEROLOGY | Facility: MEDICAL CENTER | Age: 38
End: 2023-11-15

## 2023-11-15 RX ORDER — SODIUM CHLORIDE 9 MG/ML
125 INJECTION, SOLUTION INTRAVENOUS CONTINUOUS
Status: CANCELLED | OUTPATIENT
Start: 2023-11-15

## 2023-11-15 NOTE — TELEPHONE ENCOUNTER
Please schedule Peer to Peer for patient's procedure diego. Or cancel reschedule based on providers recommendation. Thank you .

## 2023-11-15 NOTE — TELEPHONE ENCOUNTER
----- Message from [facility] sent at 11/15/2023  8:54 AM EST -----  Regarding: urgent peer to peer  Good morning,    This patient's auth for their egd was denied due to not being medically necessary by Zeinab Corcoran. Please have the provider do a peer to peer asap or cancel their procedure. Thank you.     Reference # [id number]  Phone # [phone] option 3    Elizabeth Urena

## 2023-11-16 ENCOUNTER — ANESTHESIA EVENT (OUTPATIENT)
Dept: GASTROENTEROLOGY | Facility: MEDICAL CENTER | Age: 38
End: 2023-11-16

## 2023-11-16 ENCOUNTER — ANESTHESIA (OUTPATIENT)
Dept: GASTROENTEROLOGY | Facility: MEDICAL CENTER | Age: 38
End: 2023-11-16

## 2023-11-16 ENCOUNTER — HOSPITAL ENCOUNTER (OUTPATIENT)
Dept: GASTROENTEROLOGY | Facility: MEDICAL CENTER | Age: 38
Setting detail: OUTPATIENT SURGERY
Discharge: HOME | End: 2023-11-16
Payer: COMMERCIAL

## 2023-11-16 VITALS
OXYGEN SATURATION: 100 % | HEIGHT: 66 IN | HEART RATE: 85 BPM | SYSTOLIC BLOOD PRESSURE: 110 MMHG | WEIGHT: 148.37 LBS | RESPIRATION RATE: 20 BRPM | DIASTOLIC BLOOD PRESSURE: 68 MMHG | BODY MASS INDEX: 23.84 KG/M2 | TEMPERATURE: 98.1 F

## 2023-11-16 DIAGNOSIS — R19.4 CHANGE IN BOWEL HABIT: ICD-10-CM

## 2023-11-16 DIAGNOSIS — R11.2 NAUSEA AND VOMITING IN ADULT: ICD-10-CM

## 2023-11-16 DIAGNOSIS — R14.0 BLOATING: ICD-10-CM

## 2023-11-16 LAB
EXT PREGNANCY TEST URINE: NEGATIVE
EXT. CONTROL: NORMAL

## 2023-11-16 PROCEDURE — 81025 URINE PREGNANCY TEST: CPT | Performed by: PAIN MEDICINE

## 2023-11-16 PROCEDURE — 45378 DIAGNOSTIC COLONOSCOPY: CPT | Performed by: INTERNAL MEDICINE

## 2023-11-16 RX ORDER — SODIUM CHLORIDE 9 MG/ML
125 INJECTION, SOLUTION INTRAVENOUS CONTINUOUS
Status: DISCONTINUED | OUTPATIENT
Start: 2023-11-16 | End: 2023-11-20 | Stop reason: HOSPADM

## 2023-11-16 RX ORDER — PROPOFOL 10 MG/ML
INJECTION, EMULSION INTRAVENOUS CONTINUOUS PRN
Status: DISCONTINUED | OUTPATIENT
Start: 2023-11-16 | End: 2023-11-16

## 2023-11-16 RX ORDER — MIDAZOLAM HYDROCHLORIDE 2 MG/2ML
INJECTION, SOLUTION INTRAMUSCULAR; INTRAVENOUS AS NEEDED
Status: DISCONTINUED | OUTPATIENT
Start: 2023-11-16 | End: 2023-11-16

## 2023-11-16 RX ORDER — PROPOFOL 10 MG/ML
INJECTION, EMULSION INTRAVENOUS AS NEEDED
Status: DISCONTINUED | OUTPATIENT
Start: 2023-11-16 | End: 2023-11-16

## 2023-11-16 RX ADMIN — PROPOFOL 40 MG: 10 INJECTION, EMULSION INTRAVENOUS at 14:09

## 2023-11-16 RX ADMIN — PROPOFOL 120 MG: 10 INJECTION, EMULSION INTRAVENOUS at 14:04

## 2023-11-16 RX ADMIN — SODIUM CHLORIDE 125 ML/HR: 0.9 INJECTION, SOLUTION INTRAVENOUS at 13:35

## 2023-11-16 RX ADMIN — PROPOFOL 120 MCG/KG/MIN: 10 INJECTION, EMULSION INTRAVENOUS at 14:08

## 2023-11-16 RX ADMIN — PROPOFOL 40 MG: 10 INJECTION, EMULSION INTRAVENOUS at 14:07

## 2023-11-16 RX ADMIN — MIDAZOLAM 2 MG: 1 INJECTION INTRAMUSCULAR; INTRAVENOUS at 14:00

## 2023-11-16 NOTE — ANESTHESIA POSTPROCEDURE EVALUATION
Post-Op Assessment Note    CV Status:  Stable    Pain management: adequate       Mental Status:  Alert and awake   Hydration Status:  Euvolemic   PONV Controlled:  Controlled   Airway Patency:  Patent     Post Op Vitals Reviewed: Yes    No anethesia notable event occurred.     Staff: Anesthesiologist               BP      Temp      Pulse     Resp      SpO2      /68   Pulse 85   Temp 98.1 °F (36.7 °C) (Temporal)   Resp 20   Ht 5' 6" (1.676 m)   Wt 67.3 kg (148 lb 5.9 oz)   SpO2 100%   BMI 23.95 kg/m²

## 2023-11-16 NOTE — ANESTHESIA PREPROCEDURE EVALUATION
Procedure:  COLONOSCOPY  EGD    Relevant Problems   No relevant active problems        Physical Exam    Airway    Mallampati score: II  TM Distance: >3 FB  Neck ROM: full     Dental   No notable dental hx     Cardiovascular  Rhythm: regular, Rate: normal, Cardiovascular exam normal    Pulmonary  Pulmonary exam normal Breath sounds clear to auscultation    Other Findings  post-pubertal.      Anesthesia Plan  ASA Score- 1     Anesthesia Type- IV sedation with anesthesia with ASA Monitors. Additional Monitors:     Airway Plan:            Plan Factors-Exercise tolerance (METS): >4 METS. Chart reviewed. Patient summary reviewed. Patient is not a current smoker. Patient did not smoke on day of surgery. Induction- intravenous. Postoperative Plan-     Informed Consent- Anesthetic plan and risks discussed with patient. normal

## 2023-11-16 NOTE — TELEPHONE ENCOUNTER
I called twice and set up a time for a P2P. They were supposed to call me between 2 and 3 PM but never received a call. They on [address] time. I let the office know to cancel the EGD because I did not hear back from them. They are not available until 11 AM [facility] standard time today. Do want me to call them back or do you want to handle this?

## 2023-11-16 NOTE — INTERVAL H&P NOTE
H&P reviewed. After examining the patient I find no changes in the patients condition since the H&P had been written.     Vitals:    11/16/23 1329   BP: 113/55   Pulse: 64   Resp: 15   Temp: 98.1 °F (36.7 °C)   SpO2: 100%

## 2023-11-25 ENCOUNTER — APPOINTMENT (OUTPATIENT)
Dept: LAB | Facility: HOSPITAL | Age: 38
End: 2023-11-25
Payer: COMMERCIAL

## 2023-11-25 DIAGNOSIS — R19.4 CHANGE IN BOWEL HABIT: ICD-10-CM

## 2023-11-25 DIAGNOSIS — R14.0 BLOATING: ICD-10-CM

## 2023-11-25 LAB
IGA SERPL-MCNC: 271 MG/DL (ref 66–433)
TSH SERPL DL<=0.05 MIU/L-ACNC: 1.98 UIU/ML (ref 0.45–4.5)

## 2023-11-25 PROCEDURE — 84443 ASSAY THYROID STIM HORMONE: CPT

## 2023-11-25 PROCEDURE — 36415 COLL VENOUS BLD VENIPUNCTURE: CPT

## 2023-11-25 PROCEDURE — 82784 ASSAY IGA/IGD/IGG/IGM EACH: CPT

## 2023-11-25 PROCEDURE — 86364 TISS TRNSGLTMNASE EA IG CLAS: CPT

## 2023-11-27 LAB — TTG IGA SER-ACNC: <2 U/ML (ref 0–3)

## 2023-12-14 ENCOUNTER — OFFICE VISIT (OUTPATIENT)
Dept: GASTROENTEROLOGY | Facility: CLINIC | Age: 38
End: 2023-12-14
Payer: COMMERCIAL

## 2023-12-14 VITALS
DIASTOLIC BLOOD PRESSURE: 80 MMHG | TEMPERATURE: 97.2 F | BODY MASS INDEX: 23.82 KG/M2 | WEIGHT: 148.2 LBS | SYSTOLIC BLOOD PRESSURE: 110 MMHG | HEIGHT: 66 IN

## 2023-12-14 DIAGNOSIS — K59.04 CHRONIC IDIOPATHIC CONSTIPATION: Primary | ICD-10-CM

## 2023-12-14 DIAGNOSIS — R14.0 BLOATING: ICD-10-CM

## 2023-12-14 DIAGNOSIS — K21.9 GASTROESOPHAGEAL REFLUX DISEASE, UNSPECIFIED WHETHER ESOPHAGITIS PRESENT: ICD-10-CM

## 2023-12-14 PROCEDURE — 99213 OFFICE O/P EST LOW 20 MIN: CPT | Performed by: PHYSICIAN ASSISTANT

## 2023-12-14 NOTE — PATIENT INSTRUCTIONS
High Fiber Diet   AMBULATORY CARE:   A high-fiber diet  includes foods that have a high amount of fiber. Fiber is the part of fruits, vegetables, and grains that is not broken down by your body. Fiber keeps your bowel movements regular. Fiber can also help lower your cholesterol level, control blood sugar in people with diabetes, and relieve constipation. Fiber can also help you control your weight because it helps you feel full faster. Most adults should eat 25 to 35 grams of fiber each day. Talk to your dietitian or healthcare provider about the amount of fiber you need. Good sources of fiber:       Foods with at least 4 grams of fiber per serving:      ? to ½ cup of high-fiber cereal (check the nutrition label on the box)    ½ cup of blackberries or raspberries    4 dried prunes    1 cooked artichoke    ½ cup of cooked legumes, such as lentils, or red, kidney, and fiore beans    Foods with 1 to 3 grams of fiber per servin slice of whole-wheat, pumpernickel, or rye bread    ½ cup of cooked brown rice    4 whole-wheat crackers    1 cup of oatmeal    ½ cup of cereal with 1 to 3 grams of fiber per serving (check the nutrition label on the box)    1 small piece of fruit, such as an apple, banana, pear, kiwi, or orange    3 dates    ½ cup of canned apricots, fruit cocktail, peaches, or pears    ½ cup of raw or cooked vegetables, such as carrots, cauliflower, cabbage, spinach, squash, or corn  Ways that you can increase fiber in your diet:   Choose brown or wild rice instead of white rice. Use whole wheat flour in recipes instead of white or all-purpose flour. Add beans and peas to casseroles or soups. Choose fresh fruit and vegetables with peels or skins on instead of juices. Other diet guidelines to follow: Add fiber to your diet slowly. You may have abdominal discomfort, bloating, and gas if you add fiber to your diet too quickly. Drink plenty of liquids as you add fiber to your diet. You may have nausea or develop constipation if you do not drink enough water. Ask how much liquid to drink each day and which liquids are best for you. © Copyright [facility] 2023 Information is for End User's use only and may not be sold, redistributed or otherwise used for commercial purposes. The above information is an  only. It is not intended as medical advice for individual conditions or treatments. Talk to your doctor, nurse or pharmacist before following any medical regimen to see if it is safe and effective for you.

## 2023-12-14 NOTE — PROGRESS NOTES
[facility] - Outpatient Follow-up Note  Marleni Herman 41 y.o. female MRN: [id number]  Encounter: [id number]  ASSESSMENT AND PLAN:    1. Chronic idiopathic constipation  2. Bloating  We reviewed her TSH, celiac panel, colonoscopy results. Patient expressed understanding to results. All questions answered. Discussed with patient that her symptoms are likely due to chronic idiopathic constipation. Recommended patient start consistent Miralax powder OTC once daily, high fiber diet, increased water intake, and activity as tolerated to prevent/ avoid constipation. He would like to avoid prescription strength medications to treat her constipation if possible   We will check a CBC, CMP, H. pylori for completeness      - CBC; Future  - Comprehensive metabolic panel; Future  - H. pylori antigen, stool; Future    3. Gastroesophageal reflux disease, unspecified whether esophagitis present  Her epigastric pain and nausea have resolved since last visit. She is using famotidine as needed for heartburn and reflux symptoms. I recommended that she continue to use famotidine 40 mg as needed for heartburn, reflux, indigestion. We discussed GERD diet and lifestyle. We will hold off on EGD at this time given improvement in her symptoms. Patient was instructed to call the office with any questions, concerns, new/ worsening/ persisting GI symptoms. Advised patient go to the ER with any severe or worsening abdominal pain, fevers/ chills, intractable N/V, chest pain, SOB, dizziness, lightheadedness, feeling something stuck in esophagus that will not go down. Patient expressed understanding and is in agreement with treatment plan. Will plan to follow up in ~ 3 months   ____________________    SUBJECTIVE    Patient presents to the office today for follow-up.   She was last seen in the office by me on October 18, 2023 for change in bowel habit, worsening constipation, epigastric pain, nausea and vomiting, bloating. Previous office note was reviewed. At last office visit I ordered TSH and celiac panel, colonoscopy, EGD. I recommended patient start famotidine 40 mg once daily at bedtime as well as MiraLAX powder over-the-counter once daily. TSH and celiac panel from 11/15/2023  was normal.  Colonoscopy 11/16/2023 revealed a completely normal colon. EGD was not completed, this was denied by her insurance. Patient reports improvement in her constipation today. She was previously having a bowel movement about 2 times a week. Currently, she is having a BM about every  2 days. Her stool continues to be hard and she has to strain. Taking the miralax about every 2 days as needed. She notes she forgets to take it every day. She continues to have gas and also bloating. Her epigastric abdominal pain and nausea has improved/resolved. She is using the famotidine 40 mg only as needed for reflux with relief. Her weight is stable from last visit. Patient denies any fevers/ chills, vomiting, black or bloody stools, dysphagia, odynophagia. Review of Systems   Constitutional:  Negative for chills and fever. HENT:  Negative for ear pain and sore throat. Eyes:  Negative for pain and visual disturbance. Respiratory:  Negative for cough and shortness of breath. Cardiovascular:  Negative for chest pain and palpitations. Gastrointestinal:  Positive for constipation. Negative for vomiting. Genitourinary:  Negative for dysuria and hematuria. Musculoskeletal:  Positive for arthralgias. Negative for back pain. Skin:  Negative for color change and rash. Neurological:  Negative for seizures and syncope. All other systems reviewed and are negative.        Historical Information   Past Medical History:   Diagnosis Date    Bacterial vaginosis 04/2018     Past Surgical History:   Procedure Laterality Date    NO PAST SURGERIES       Social History   Social History     Substance and Sexual Activity Alcohol Use Yes    Comment: socially     Social History     Substance and Sexual Activity   Drug Use No     Social History     Tobacco Use   Smoking Status Never   Smokeless Tobacco Never     Family History   Problem Relation Age of Onset    Diabetes Mother     No Known Problems Father        Meds/Allergies       Current Outpatient Medications:     famotidine (PEPCID) 40 MG tablet    fexofenadine-pseudoephedrine (ALLEGRA-D)  MG per tablet    ibuprofen (MOTRIN) 600 mg tablet    lidocaine-prilocaine (EMLA) cream    polyethylene glycol (GOLYTELY) 4000 mL solution    Polyethylene Glycol 3350 POWD    Allergies   Allergen Reactions    Sesame Seed (Diagnostic) - Food Allergy Itching and Swelling     Pt reports throat swelling and rash to back and chest           Objective     Wt Readings from Last 3 Encounters:   11/16/23 67.3 kg (148 lb 5.9 oz)   10/18/23 67.3 kg (148 lb 6.4 oz)   01/10/22 66.7 kg (147 lb 0.8 oz)     Temp Readings from Last 3 Encounters:   11/16/23 98.1 °F (36.7 °C) (Temporal)   10/18/23 97.8 °F (36.6 °C) (Tympanic)   01/10/22 98.5 °F (36.9 °C) (Oral)     BP Readings from Last 3 Encounters:   11/16/23 110/68   10/18/23 112/70   01/10/22 146/96     Pulse Readings from Last 3 Encounters:   11/16/23 85   01/10/22 96   09/09/21 (!) 113          PHYSICAL EXAM:      Physical Exam  Vitals reviewed. Constitutional:       General: She is not in acute distress. Appearance: She is not toxic-appearing. HENT:      Head: Normocephalic and atraumatic. Eyes:      Extraocular Movements: Extraocular movements intact. Conjunctiva/sclera: Conjunctivae normal.   Cardiovascular:      Rate and Rhythm: Normal rate and regular rhythm. Pulmonary:      Effort: Pulmonary effort is normal. No respiratory distress. Breath sounds: Normal breath sounds. Abdominal:      General: Bowel sounds are normal.      Palpations: Abdomen is soft. Tenderness: There is no abdominal tenderness.    Musculoskeletal: General: No swelling or tenderness. Cervical back: Normal range of motion and neck supple. Skin:     General: Skin is warm and dry. Coloration: Skin is not jaundiced. Neurological:      General: No focal deficit present. Mental Status: She is alert and oriented to person, place, and time. Mental status is at baseline. Psychiatric:         Mood and Affect: Mood normal.         Behavior: Behavior normal.         Thought Content: Thought content normal.      Lab Results:   No visits with results within 1 Day(s) from this visit. Latest known visit with results is:   Appointment on 11/25/2023   Component Date Value    TSH 3RD GENERATON 11/25/2023 1.976     TISSUE TRANSGLUTAMINASE * 11/25/2023 <2     IGA 11/25/2023 271        Radiology Results:   Colonoscopy    Result Date: 11/16/2023  Narrative: Table formatting from the original result was not included. [facility] [address] [place] [address],2 [address] 3 [address] Floors [phone] DATE OF SERVICE: 11/16/23 PHYSICIAN(S): Attending: Jose L Murillo MD Fellow: No Staff Documented INDICATION: Bloating, Change in bowel habit POST-OP DIAGNOSIS: See the impression below. HISTORY: Prior colonoscopy: No prior colonoscopy. BOWEL PREPARATION: Golytely/Colyte/Trilyte PREPROCEDURE: Informed consent was obtained for the procedure, including sedation. Risks including but not limited to bleeding, infection, perforation, adverse drug reaction and aspiration were explained in detail. Also explained about less than 100% sensitivity with the exam and other alternatives. The patient was placed in the left lateral decubitus position. Procedure: Colonoscopy DETAILS OF PROCEDURE: Patient was taken to the procedure room where a time out was performed to confirm correct patient and correct procedure.  The patient underwent monitored anesthesia care, which was administered by an anesthesia professional. The patient's blood pressure, heart rate, level of consciousness, oxygen, respirations and ECG were monitored throughout the procedure. A digital rectal exam was performed. A perianal exam was performed. The scope was introduced through the anus and advanced to the cecum. Retroflexion was performed in the rectum. The quality of bowel preparation was evaluated using the [facility] Bowel Preparation Scale with scores of: right colon = 2, transverse colon = 2, left colon = 2. The total BBPS score was 6. Bowel prep was adequate. The patient experienced no blood loss. The procedure was moderately difficult due to angulation. In response to procedure difficulty, the instrument was changed to a pediatric endoscope. The patient tolerated the procedure well. There were no apparent adverse events. ANESTHESIA INFORMATION: ASA: I Anesthesia Type: IV Sedation with Anesthesia MEDICATIONS: sodium chloride 0.9 % infusion 750 mL*  *From user-documented volume (Totals for administrations occurring from 1402 to 1426 on 11/16/23) FINDINGS: The cecum, ascending colon, transverse colon, descending colon, sigmoid colon and rectosigmoid appeared normal. EVENTS: Procedure Events Event Event Time ENDO CECUM REACHED 11/16/2023  2:21 PM ENDO SCOPE OUT TIME 11/16/2023  2:23 PM SPECIMENS: * No specimens in log * EQUIPMENT: Colonoscope -[id number] Switched to pediatric scope. Colonoscope - Switched to pediatric scope. Impression: The cecum, ascending colon, transverse colon, descending colon, sigmoid colon and rectosigmoid appeared normal. RECOMMENDATION:  Follow up with PCP  Follow-up with Chacho Marie in the office. Await insurance approval for EGD and then proceed with EGD for further evaluation.     MD Chacho Liriano PA-C   Available on [facility]

## 2023-12-28 ENCOUNTER — TRANSCRIBE ORDERS (OUTPATIENT)
Dept: GASTROENTEROLOGY | Facility: CLINIC | Age: 38
End: 2023-12-28

## 2024-03-01 ENCOUNTER — APPOINTMENT (OUTPATIENT)
Dept: RADIOLOGY | Facility: AMBULARY SURGERY CENTER | Age: 39
End: 2024-03-01
Attending: ORTHOPAEDIC SURGERY
Payer: COMMERCIAL

## 2024-03-01 ENCOUNTER — OFFICE VISIT (OUTPATIENT)
Dept: OBGYN CLINIC | Facility: CLINIC | Age: 39
End: 2024-03-01
Payer: COMMERCIAL

## 2024-03-01 VITALS — WEIGHT: 151 LBS | HEIGHT: 66 IN | BODY MASS INDEX: 24.27 KG/M2

## 2024-03-01 DIAGNOSIS — M71.22 BAKER'S CYST, LEFT: ICD-10-CM

## 2024-03-01 DIAGNOSIS — Q68.2 PATELLA ALTA: ICD-10-CM

## 2024-03-01 DIAGNOSIS — M25.561 RIGHT KNEE PAIN, UNSPECIFIED CHRONICITY: ICD-10-CM

## 2024-03-01 DIAGNOSIS — M71.21 BAKER'S CYST, RIGHT: ICD-10-CM

## 2024-03-01 DIAGNOSIS — M25.561 CHRONIC PAIN OF BOTH KNEES: Primary | ICD-10-CM

## 2024-03-01 DIAGNOSIS — M25.562 CHRONIC PAIN OF BOTH KNEES: Primary | ICD-10-CM

## 2024-03-01 DIAGNOSIS — M25.562 LEFT KNEE PAIN, UNSPECIFIED CHRONICITY: ICD-10-CM

## 2024-03-01 DIAGNOSIS — G89.29 CHRONIC PAIN OF BOTH KNEES: Primary | ICD-10-CM

## 2024-03-01 PROCEDURE — 99204 OFFICE O/P NEW MOD 45 MIN: CPT | Performed by: ORTHOPAEDIC SURGERY

## 2024-03-01 PROCEDURE — 73562 X-RAY EXAM OF KNEE 3: CPT

## 2024-03-01 NOTE — PROGRESS NOTES
Assessment:  1. Chronic pain of both knees  XR knee 3 vw right non injury    XR knee 3 vw left non injury      2. Baker's cyst, right  Ambulatory Referral to Sports Medicine      3. Patella rozina          Patient Active Problem List   Diagnosis    Bacterial vaginosis    IUD check up    LGSIL on Pap smear of cervix    Chronic pain of both knees    Baker's cyst, right    Patella rozina           Plan      38 y.o. who presents for initial evaluation of bilateral knee pain. Upon review of the x-rays, a thorough history and my examination, Martha is presenting with signs and symptoms consistent with a right knee baker's cyst and patella rozina. Diagnosis and treatment options discussed, all her questions were addressed. Recommend use of OTC NSAIDS as needed. Referral to Dr Rooney for anglin's cyst aspiration provided. Continue activities as tolerated.               Subjective:     Patient ID:    Chief Complaint:Martha Clark 38 y.o. female      HPI    Patient presents for initial evaluation of bilateral knee pain. Pain has been going on for years with out injury or trauma. Pain is localized to the posterior knee, it is a daily intermittent ache. Pain is aggravated with activity and weight bearing, she notes she can't run any more due to pain. Descending stairs causes pain. Pain does not radiate. Denies mechanical symptoms like catching/locking. Denies numbness or paresthesias.     The following portions of the patient's history were reviewed and updated as appropriate: allergies, current medications, past family history, past social history, past surgical history and problem list.        Social History     Socioeconomic History    Marital status: Single     Spouse name: Not on file    Number of children: Not on file    Years of education: Not on file    Highest education level: Not on file   Occupational History    Not on file   Tobacco Use    Smoking status: Never    Smokeless tobacco: Never   Vaping Use    Vaping status:  Never Used   Substance and Sexual Activity    Alcohol use: Yes     Comment: socially    Drug use: No    Sexual activity: Yes     Partners: Male     Birth control/protection: I.U.D.   Other Topics Concern    Not on file   Social History Narrative    Not on file     Social Determinants of Health     Financial Resource Strain: Not on file   Food Insecurity: Not on file   Transportation Needs: Not on file   Physical Activity: Not on file   Stress: Not on file   Social Connections: Not on file   Intimate Partner Violence: Not on file   Housing Stability: Not on file     Past Medical History:   Diagnosis Date    Bacterial vaginosis 04/2018     Past Surgical History:   Procedure Laterality Date    NO PAST SURGERIES       Allergies   Allergen Reactions    Sesame Seed (Diagnostic) - Food Allergy Itching and Swelling     Pt reports throat swelling and rash to back and chest     Current Outpatient Medications on File Prior to Visit   Medication Sig Dispense Refill    fexofenadine-pseudoephedrine (ALLEGRA-D)  MG per tablet Take 1 tablet by mouth every 12 (twelve) hours 20 tablet 0    famotidine (PEPCID) 40 MG tablet Take 1 tablet (40 mg total) by mouth daily at bedtime (Patient not taking: Reported on 3/1/2024) 30 tablet 3    ibuprofen (MOTRIN) 600 mg tablet Take 1 tablet (600 mg total) by mouth every 6 (six) hours as needed for mild pain (Patient not taking: Reported on 10/18/2023) 20 tablet 0    lidocaine-prilocaine (EMLA) cream Apply topically as needed for mild pain (Patient not taking: Reported on 10/18/2023) 30 g 0    Polyethylene Glycol 3350 POWD Take 17 g by mouth daily (Patient not taking: Reported on 12/14/2023)       No current facility-administered medications on file prior to visit.              Objective:    Review of Systems   Constitutional:  Negative for chills and fever.   HENT:  Negative for ear pain and sore throat.    Eyes:  Negative for pain and visual disturbance.   Respiratory:  Negative for cough  and shortness of breath.    Cardiovascular:  Negative for chest pain and palpitations.   Gastrointestinal:  Negative for abdominal pain and vomiting.   Genitourinary:  Negative for dysuria and hematuria.   Musculoskeletal:  Negative for arthralgias and back pain.   Skin:  Negative for color change and rash.   Neurological:  Negative for seizures and syncope.   All other systems reviewed and are negative.      Right Knee Exam     Tenderness   The patient is experiencing no tenderness.     Range of Motion   Extension:  0   Flexion:  120     Tests   Jose:  Medial - negative Lateral - negative  Varus: negative Valgus: negative    Other   Erythema: absent  Sensation: normal  Pulse: present  Swelling: none  Effusion: no effusion present    Comments:  Palpable baker's cyst  (-) patellar grind   Skin is warm and dry to touch with no signs of erythema, ecchymosis, or infection   No soft tissue swelling or effusion noted  No tenderness to palpation on exam  Flexor and extensor mechanisms are intact   Knee is stable to varus and valgus stress  Patella tracks centrally without palpable crepitus  Calf compartments are soft and supple  2+ DP and PT pulses with brisk capillary refill to the toes  Sural, saphenous, tibial, superficial, and deep peroneal motor and sensory distributions intact  Sensation light touch intact distally        Left Knee Exam     Tenderness   The patient is experiencing no tenderness.     Range of Motion   Extension:  0   Flexion:  120     Tests   Jose:  Medial - negative Lateral - negative  Varus: negative Valgus: negative    Other   Erythema: absent  Sensation: normal  Pulse: present  Swelling: none  Effusion: no effusion present    Comments:  (-) patellar grind   Skin is warm and dry to touch with no signs of erythema, ecchymosis, or infection   No soft tissue swelling or effusion noted  No tenderness to palpation on exam  Flexor and extensor mechanisms are intact   Knee is stable to varus and  "valgus stress  Patella tracks centrally without palpable crepitus  Calf compartments are soft and supple  2+ DP and PT pulses with brisk capillary refill to the toes  Sural, saphenous, tibial, superficial, and deep peroneal motor and sensory distributions intact  Sensation light touch intact distally              Physical Exam  Vitals and nursing note reviewed.   HENT:      Head: Normocephalic.   Eyes:      Extraocular Movements: Extraocular movements intact.   Cardiovascular:      Rate and Rhythm: Normal rate.      Pulses: Normal pulses.   Pulmonary:      Effort: Pulmonary effort is normal.   Musculoskeletal:         General: Normal range of motion.      Cervical back: Normal range of motion.      Right knee: No effusion.      Instability Tests: Medial Jose test negative and lateral Jose test negative.      Left knee: No effusion.      Instability Tests: Medial Jose test negative and lateral Jose test negative.      Comments: See ortho exam   Skin:     General: Skin is warm and dry.   Neurological:      General: No focal deficit present.      Mental Status: She is alert.   Psychiatric:         Behavior: Behavior normal.         Procedures  No Procedures performed today    I have personally reviewed pertinent films in PACS.  Patient does have patella rozina bilateral knees.    Scribe Attestation      I,:  Alida Bolaños am acting as a scribe while in the presence of the attending physician.:       I,:  Alfredo Small, DO personally performed the services described in this documentation    as scribed in my presence.:               Portions of the record may have been created with voice recognition software.  Occasional wrong word or \"sound a like\" substitutions may have occurred due to the inherent limitations of voice recognition software.  Read the chart carefully and recognize, using context, where substitutions have occurred.   "

## 2024-03-14 ENCOUNTER — OFFICE VISIT (OUTPATIENT)
Dept: GASTROENTEROLOGY | Facility: CLINIC | Age: 39
End: 2024-03-14
Payer: COMMERCIAL

## 2024-03-14 VITALS
SYSTOLIC BLOOD PRESSURE: 100 MMHG | DIASTOLIC BLOOD PRESSURE: 62 MMHG | HEIGHT: 66 IN | BODY MASS INDEX: 23.78 KG/M2 | WEIGHT: 148 LBS | TEMPERATURE: 98.5 F

## 2024-03-14 DIAGNOSIS — K59.04 CHRONIC IDIOPATHIC CONSTIPATION: Primary | ICD-10-CM

## 2024-03-14 DIAGNOSIS — R14.0 BLOATING: ICD-10-CM

## 2024-03-14 PROCEDURE — 99213 OFFICE O/P EST LOW 20 MIN: CPT | Performed by: PHYSICIAN ASSISTANT

## 2024-03-14 RX ORDER — EPINEPHRINE 0.3 MG/.3ML
INJECTION SUBCUTANEOUS
COMMUNITY

## 2024-03-14 RX ORDER — ESOMEPRAZOLE MAGNESIUM 40 MG/1
40 CAPSULE, DELAYED RELEASE ORAL DAILY
COMMUNITY
Start: 2024-03-10

## 2024-03-14 NOTE — PROGRESS NOTES
[facility] - Outpatient Follow-up Note  Martha Clark 38 y.o. female MRN: [id number]  Encounter: [id number]  ASSESSMENT AND PLAN:    1. Chronic idiopathic constipation  2. Bloating  Patient with chronic idiopathic constipation currently under excellent control with MiraLAX.  I recommend she continue with her current bowel regimen.  I recommended she follow more of a high-fiber diet and we reviewed the importance of staying hydrated, drinking at least 64 ounces of water a day.  I recommended patient complete H. pylori stool testing as was previously ordered, she tells me she forgot about this and will complete it within the next week or so.  Discussed with the patient that H. pylori can cause bloating so it is important that we make sure this infection has been ruled out.  She expressed understanding.  Otherwise no plans for further workup at this time.    Patient was instructed to call the office with any questions, concerns, new/ worsening/ persisting GI symptoms. Advised patient go to the ER with any severe or worsening abdominal pain, fevers/ chills, intractable N/V, chest pain, SOB, dizziness, lightheadedness, feeling something stuck in esophagus that will not go down. Patient expressed understanding and is in agreement with treatment plan.     Will plan to follow up in about 4 months  __________________________________________________________    SUBJECTIVE:      Patient presents to the office today for follow-up. She was last seen in the office by me on 12/14/2023, previous office note was reviewed.  At last office visit I ordered a CBC, CMP, stool H. pylori.  I recommended she start consistent MiraLAX powder over-the-counter once daily and eat a diet high in fiber.  I also recommended she continue to use famotidine 40 mg as needed for heartburn and reflux.      Patient reports doing well.  No acute or new complaints today.  She tells me she takes MiraLAX over-the-counter powder  every other day.  On this regimen her constipation is under great control.  She has a formed, brown, soft bowel movement about every other day.  She does continue to experience abdominal bloating at times.  Her weight is stable from last visit.   Patient denies any fevers/ chills, vomiting, abdominal pain, nausea, vomiting, heartburn, acid reflux, black or bloody stools, dysphagia, odynophagia.       Review of Systems   Constitutional:  Negative for chills and fever.   HENT:  Negative for ear pain and sore throat.    Eyes:  Negative for pain and visual disturbance.   Respiratory:  Negative for cough and shortness of breath.    Cardiovascular:  Negative for chest pain and palpitations.   Gastrointestinal:  Negative for abdominal pain and vomiting.   Genitourinary:  Negative for dysuria and hematuria.   Musculoskeletal:  Negative for arthralgias and back pain.   Skin:  Negative for color change and rash.   Neurological:  Negative for seizures and syncope.   All other systems reviewed and are negative.         Historical Information   Past Medical History:   Diagnosis Date    Bacterial vaginosis 04/2018     Past Surgical History:   Procedure Laterality Date    NO PAST SURGERIES       Social History   Social History     Substance and Sexual Activity   Alcohol Use Yes    Comment: socially     Social History     Substance and Sexual Activity   Drug Use No     Social History     Tobacco Use   Smoking Status Never   Smokeless Tobacco Never     Family History   Problem Relation Age of Onset    Diabetes Mother     No Known Problems Father        Meds/Allergies       Current Outpatient Medications:     famotidine (PEPCID) 40 MG tablet    fexofenadine-pseudoephedrine (ALLEGRA-D)  MG per tablet    ibuprofen (MOTRIN) 600 mg tablet    lidocaine-prilocaine (EMLA) cream    Polyethylene Glycol 3350 POWD    Allergies   Allergen Reactions    Sesame Seed (Diagnostic) - Food Allergy Itching and Swelling     Pt reports throat  swelling and rash to back and chest           Objective     Wt Readings from Last 3 Encounters:   03/01/24 68.5 kg (151 lb)   12/14/23 67.2 kg (148 lb 3.2 oz)   11/16/23 67.3 kg (148 lb 5.9 oz)     Temp Readings from Last 3 Encounters:   12/14/23 (!) 97.2 °F (36.2 °C) (Tympanic)   11/16/23 98.1 °F (36.7 °C) (Temporal)   10/18/23 97.8 °F (36.6 °C) (Tympanic)     BP Readings from Last 3 Encounters:   12/14/23 110/80   11/16/23 110/68   10/18/23 112/70     Pulse Readings from Last 3 Encounters:   11/16/23 85   01/10/22 96   09/09/21 (!) 113          PHYSICAL EXAM:      Physical Exam  Vitals reviewed.   Constitutional:       General: She is not in acute distress.     Appearance: She is not toxic-appearing.   HENT:      Head: Normocephalic and atraumatic.   Eyes:      Extraocular Movements: Extraocular movements intact.      Conjunctiva/sclera: Conjunctivae normal.   Cardiovascular:      Rate and Rhythm: Normal rate and regular rhythm.   Pulmonary:      Effort: Pulmonary effort is normal. No respiratory distress.      Breath sounds: Normal breath sounds.   Abdominal:      General: Bowel sounds are normal.      Palpations: Abdomen is soft.      Tenderness: There is no abdominal tenderness.   Musculoskeletal:         General: No swelling or tenderness.      Cervical back: Normal range of motion and neck supple.   Skin:     General: Skin is warm and dry.      Coloration: Skin is not jaundiced.   Neurological:      General: No focal deficit present.      Mental Status: She is alert and oriented to person, place, and time. Mental status is at baseline.   Psychiatric:         Mood and Affect: Mood normal.         Behavior: Behavior normal.         Thought Content: Thought content normal.          Lab Results:   No visits with results within 1 Day(s) from this visit.   Latest known visit with results is:   Appointment on 11/25/2023   Component Date Value    TSH 3RD GENERATON 11/25/2023 1.976     TISSUE TRANSGLUTAMINASE *  11/25/2023 <2     IGA 11/25/2023 271      CBC from 12/22/2023 reviewed, this was completed at [facility], this was completely normal.      Lab Results   Component Value Date    SODIUM 139 12/22/2023    K 4.0 12/22/2023     12/22/2023    CO2 30 12/22/2023    AGAP 5 12/22/2023    BUN 10 12/22/2023    CREATININE 0.72 12/22/2023    GLUC 79 12/22/2023    CALCIUM 9.3 12/22/2023    AST 11 12/22/2023    ALT 8 12/22/2023    ALKPHOS 46 12/22/2023    TP 6.8 12/22/2023    TBILI 0.6 12/22/2023    EGFR 110 12/22/2023       Prior Procedure Results/Reports   Colonoscopy from 11/16/2023 was reviewed, procedure revealed completely normal colon.    Rosa Maria Musa PA-C   Available on [email or web address]

## 2024-03-26 ENCOUNTER — OFFICE VISIT (OUTPATIENT)
Dept: OBGYN CLINIC | Facility: CLINIC | Age: 39
End: 2024-03-26
Payer: COMMERCIAL

## 2024-03-26 VITALS — HEART RATE: 57 BPM | DIASTOLIC BLOOD PRESSURE: 67 MMHG | SYSTOLIC BLOOD PRESSURE: 107 MMHG

## 2024-03-26 DIAGNOSIS — M71.22 BAKER'S CYST, LEFT: Primary | ICD-10-CM

## 2024-03-26 DIAGNOSIS — M71.21 BAKER'S CYST, RIGHT: ICD-10-CM

## 2024-03-26 PROCEDURE — 99243 OFF/OP CNSLTJ NEW/EST LOW 30: CPT | Performed by: PHYSICAL MEDICINE & REHABILITATION

## 2024-03-26 NOTE — PROGRESS NOTES
1. Baker's cyst, left        2. Baker's cyst, right  Ambulatory Referral to Sports Medicine    bilateral        No orders of the defined types were placed in this encounter.       Impression:  This is a patient referred by Dr. Small's team with bilateral knee pain possibly leading to symptomatic Baker's cyst(s).  The patient is a good candidate for ultrasound-guided evaluation of the posterior knee.  Upon evaluation, there was no cystic structure between the medial head of gastrocnemius and distal semimembranosus.  I also evaluated the lateral portion of her knee where she reports her symptoms but there was no cyst.  She can try diclofenac gel.  She has had physical therapy in the past.  If continued symptoms, would consider advanced imaging.    Imaging Studies (I personally reviewed images in PACS and report):  Bilateral knee x-rays most recent to this encounter reviewed.  These images show no acute osseous abnormalities.  Minimal medial tibiofemoral narrowing.    No follow-ups on file.    Patient is in agreement with the above plan.    HPI:  Martha Clark is a 38 y.o. female  who presents for evaluation of No chief complaint on file.      History of present illness from referring clinician's notes reviewed.    Following history reviewed and updated:  Past Medical History:   Diagnosis Date    Bacterial vaginosis 04/2018     Past Surgical History:   Procedure Laterality Date    NO PAST SURGERIES       Social History   Social History     Substance and Sexual Activity   Alcohol Use Yes    Comment: socially     Social History     Substance and Sexual Activity   Drug Use No     Social History     Tobacco Use   Smoking Status Never   Smokeless Tobacco Never     Family History   Problem Relation Age of Onset    Diabetes Mother     No Known Problems Father      Allergies   Allergen Reactions    Sesame Seed (Diagnostic) - Food Allergy Itching and Swelling     Pt reports throat swelling and rash to back and chest         Constitutional:  There were no vitals taken for this visit.   General: NAD.  Eyes: Anicteric sclerae.  Neck: Supple.  Lungs: Unlabored breathing.  Cardiovascular: No lower extremity edema.  Skin: Intact without erythema.  Neurologic: Sensation intact to light touch.  Psychiatric: Mood and affect are appropriate.    Knee examination shows no skin changes.  No pulsatile mass in the posterior knee.  Full knee extension.      Procedures

## 2024-03-26 NOTE — PATIENT INSTRUCTIONS
You can obtain diclofenac cream/gel/ointment over the counter.      Many brands make this medication and they include Voltaren, Aspercreme and Aleve.    You can use this up to 3 times per day.  It is best to wash the area with water, pat dry and then apply.  The cream absorbs better after this.    Be careful not to let any pets or children get in contact with the medication as it can be harmful to them.    If you develop a skin reaction, stop using the cream.

## 2024-03-26 NOTE — LETTER
March 26, 2024     Laverne Paul PA-C  [address] [facility] [id number]    Patient: Martha Clark   YOB: 1985   Date of Visit: 3/26/2024       Dear Dr. Paul:    Thank you for referring Martha Clark to me for evaluation. Below are my notes for this consultation.    If you have questions, please do not hesitate to call me. I look forward to following your patient along with you.         Sincerely,        Josh Rooney DO        CC: DO Josh Zacarias DO  3/26/2024 11:12 AM  Signed  1. Baker's cyst, left        2. Baker's cyst, right  Ambulatory Referral to Sports Medicine    bilateral        No orders of the defined types were placed in this encounter.       Impression:  This is a patient referred by Dr. Small's team with bilateral knee pain possibly leading to symptomatic Baker's cyst(s).  The patient is a good candidate for ultrasound-guided evaluation of the posterior knee.  Upon evaluation, there was no cystic structure between the medial head of gastrocnemius and distal semimembranosus.  I also evaluated the lateral portion of her knee where she reports her symptoms but there was no cyst.  She can try diclofenac gel.  She has had physical therapy in the past.  If continued symptoms, would consider advanced imaging.    Imaging Studies (I personally reviewed images in PACS and report):  Bilateral knee x-rays most recent to this encounter reviewed.  These images show no acute osseous abnormalities.  Minimal medial tibiofemoral narrowing.    No follow-ups on file.    Patient is in agreement with the above plan.    HPI:  Martha Clark is a 38 y.o. female  who presents for evaluation of No chief complaint on file.      History of present illness from referring clinician's notes reviewed.    Following history reviewed and updated:  Past Medical History:   Diagnosis Date   • Bacterial vaginosis 04/2018     Past Surgical History:   Procedure Laterality Date   • NO PAST SURGERIES        Social History  Social History     Substance and Sexual Activity   Alcohol Use Yes    Comment: socially     Social History     Substance and Sexual Activity   Drug Use No     Social History     Tobacco Use   Smoking Status Never   Smokeless Tobacco Never     Family History   Problem Relation Age of Onset   • Diabetes Mother    • No Known Problems Father      Allergies   Allergen Reactions   • Sesame Seed (Diagnostic) - Food Allergy Itching and Swelling     Pt reports throat swelling and rash to back and chest        Constitutional:  There were no vitals taken for this visit.   General: NAD.  Eyes: Anicteric sclerae.  Neck: Supple.  Lungs: Unlabored breathing.  Cardiovascular: No lower extremity edema.  Skin: Intact without erythema.  Neurologic: Sensation intact to light touch.  Psychiatric: Mood and affect are appropriate.    Knee examination shows no skin changes.  No pulsatile mass in the posterior knee.  Full knee extension.      Procedures

## 2024-04-08 ENCOUNTER — OFFICE VISIT (OUTPATIENT)
Dept: OBGYN CLINIC | Facility: CLINIC | Age: 39
End: 2024-04-08
Payer: COMMERCIAL

## 2024-04-08 VITALS — BODY MASS INDEX: 23.78 KG/M2 | HEIGHT: 66 IN | WEIGHT: 148 LBS

## 2024-04-08 DIAGNOSIS — G89.29 CHRONIC PAIN OF BOTH KNEES: Primary | ICD-10-CM

## 2024-04-08 DIAGNOSIS — M71.21 BAKER'S CYST, RIGHT: ICD-10-CM

## 2024-04-08 DIAGNOSIS — M54.16 RADICULOPATHY, LUMBAR REGION: ICD-10-CM

## 2024-04-08 DIAGNOSIS — M25.562 CHRONIC PAIN OF BOTH KNEES: Primary | ICD-10-CM

## 2024-04-08 DIAGNOSIS — Q68.2 PATELLA ALTA: ICD-10-CM

## 2024-04-08 DIAGNOSIS — S74.00XA: ICD-10-CM

## 2024-04-08 DIAGNOSIS — M71.22 BAKER'S CYST, LEFT: ICD-10-CM

## 2024-04-08 DIAGNOSIS — M25.561 CHRONIC PAIN OF BOTH KNEES: Primary | ICD-10-CM

## 2024-04-08 PROCEDURE — 99212 OFFICE O/P EST SF 10 MIN: CPT | Performed by: ORTHOPAEDIC SURGERY

## 2024-04-08 NOTE — PROGRESS NOTES
Assessment:  1. Chronic pain of both knees  MRI knee right wo contrast    [facility]ulatory Referral to [facility]      2. Patella rozina  MRI knee right wo contrast    Ambulatory Referral to [facility]      3. Radiculopathy, lumbar region        4. Injury of sciatic nerve, unspecified laterality, initial encounter        5. Baker's cyst, right  MRI knee right wo contrast    Ambulatory Referral to [facility]      6. Baker's cyst, left  MRI knee right wo contrast    Ambulatory Referral to [facility]          Patient Active Problem List   Diagnosis    Bacterial vaginosis    IUD check up    LGSIL on Pap smear of cervix    Chronic pain of both knees    Baker's cyst, right    Patella rozina    Boland's cyst, left    Chronic idiopathic constipation           Plan      38 y.o. with suspected baker's cyst, sciatic nerve injury with radiculopathy  Physical exam performed today, diagnostic imaging reviewed, and thorough subjective history obtained during today's visit.  Diagnosis, operative and non-operative treatment options, as well as expected outcomes and recoveries of each were discussed with the patient. The patient verbalized understanding of exam findings and treatment plan. The patient was given the opportunity to ask questions and all of their questions were addressed during today's visit.  Discussed with Martha that her history and physical exam are most consistent with a Baker's cyst, despite US guided aspiration being unable to identify any cystic structures in her posterior knee. Therefore, we will obtain right knee MRI to further evaluate for baker's cyst, internal derangement, and all other pathologies  Order placed today for Martha to also follow up with [facility] for further evaluation and treatment of her low back pain  May use ice or heat as needed for pain relief  May take NSAIDs/Tylenol as needed for pain control          Subjective:      Patient ID:    Chief Complaint:Martha Clark 38 y.o. female      HPI    Patient presents for follow up evaluation of bilateral posterior knee pain. Pain has been going on for years with out injury or trauma. Pain is localized to the posterior knee, it is a daily intermittent ache. Pain is aggravated with activity and weight bearing, she notes she can't run any more due to pain. Descending stairs causes pain. Pain does not radiate. Denies mechanical symptoms like catching/locking. Denies numbness or paresthesias. Notes previous history of low back pain and sciatic nerve injury that occurred around the same time as her knee pain.     The following portions of the patient's history were reviewed and updated as appropriate: allergies, current medications, past family history, past social history, past surgical history and problem list.        Social History     Socioeconomic History    Marital status: Single     Spouse name: Not on file    Number of children: Not on file    Years of education: Not on file    Highest education level: Not on file   Occupational History    Not on file   Tobacco Use    Smoking status: Never    Smokeless tobacco: Never   Vaping Use    Vaping status: Never Used   Substance and Sexual Activity    Alcohol use: Yes     Comment: socially    Drug use: No    Sexual activity: Yes     Partners: Male     Birth control/protection: I.U.D.   Other Topics Concern    Not on file   Social History Narrative    Not on file     Social Determinants of Health     Financial Resource Strain: Not on file   Food Insecurity: Not on file   Transportation Needs: Not on file   Physical Activity: Not on file   Stress: Not on file   Social Connections: Not on file   Intimate Partner Violence: Not on file   Housing Stability: Not on file     Past Medical History:   Diagnosis Date    Bacterial vaginosis 04/2018     Past Surgical History:   Procedure Laterality Date    NO PAST SURGERIES       Allergies   Allergen Reactions     Sesame Seed (Diagnostic) - Food Allergy Itching and Swelling     Pt reports throat swelling and rash to back and chest     Current Outpatient Medications on File Prior to Visit   Medication Sig Dispense Refill    EPINEPHrine (EPIPEN) 0.3 mg/0.3 mL SOAJ INJECT 0.3 ML (0.3 MG TOTAL) INJECT INTO THE MUSCLE ONE TIME FOR 1 DOSE.      esomeprazole (NexIUM) 40 MG capsule Take 40 mg by mouth daily      famotidine (PEPCID) 40 MG tablet Take 1 tablet (40 mg total) by mouth daily at bedtime (Patient not taking: Reported on 3/1/2024) 30 tablet 3    fexofenadine-pseudoephedrine (ALLEGRA-D)  MG per tablet Take 1 tablet by mouth every 12 (twelve) hours 20 tablet 0    ibuprofen (MOTRIN) 600 mg tablet Take 1 tablet (600 mg total) by mouth every 6 (six) hours as needed for mild pain (Patient not taking: Reported on 10/18/2023) 20 tablet 0    lidocaine-prilocaine (EMLA) cream Apply topically as needed for mild pain (Patient not taking: Reported on 3/14/2024) 30 g 0    Polyethylene Glycol 3350 POWD Take 17 g by mouth daily (Patient not taking: Reported on 12/14/2023)       No current facility-administered medications on file prior to visit.              Objective:    Review of Systems   Constitutional:  Negative for chills and fever.   HENT:  Negative for ear pain and sore throat.    Eyes:  Negative for pain and visual disturbance.   Respiratory:  Negative for cough and shortness of breath.    Cardiovascular:  Negative for chest pain and palpitations.   Gastrointestinal:  Negative for abdominal pain and vomiting.   Genitourinary:  Negative for dysuria and hematuria.   Musculoskeletal:  Negative for arthralgias and back pain.   Skin:  Negative for color change and rash.   Neurological:  Negative for seizures and syncope.   All other systems reviewed and are negative.      Right Knee Exam     Tenderness   The patient is experiencing no tenderness.     Range of Motion   Extension:  0   Flexion:  120     Tests   Jose:  Medial  - negative Lateral - negative  Varus: negative Valgus: negative    Other   Erythema: absent  Sensation: normal  Pulse: present  Swelling: none  Effusion: no effusion present    Comments:  Palpable baker's cyst  (-) patellar grind   Skin is warm and dry to touch with no signs of erythema, ecchymosis, or infection   No soft tissue swelling or effusion noted  No tenderness to palpation on exam  Flexor and extensor mechanisms are intact   Knee is stable to varus and valgus stress  Patella tracks centrally without palpable crepitus  Calf compartments are soft and supple  2+ DP and PT pulses with brisk capillary refill to the toes  Sural, saphenous, tibial, superficial, and deep peroneal motor and sensory distributions intact  Sensation light touch intact distally        Left Knee Exam     Tenderness   The patient is experiencing no tenderness.     Range of Motion   Extension:  0   Flexion:  120     Tests   Jose:  Medial - negative Lateral - negative  Varus: negative Valgus: negative    Other   Erythema: absent  Sensation: normal  Pulse: present  Swelling: none  Effusion: no effusion present    Comments:  (-) patellar grind   Skin is warm and dry to touch with no signs of erythema, ecchymosis, or infection   No soft tissue swelling or effusion noted  No tenderness to palpation on exam  Flexor and extensor mechanisms are intact   Knee is stable to varus and valgus stress  Patella tracks centrally without palpable crepitus  Calf compartments are soft and supple  2+ DP and PT pulses with brisk capillary refill to the toes  Sural, saphenous, tibial, superficial, and deep peroneal motor and sensory distributions intact  Sensation light touch intact distally              Physical Exam  Vitals and nursing note reviewed.   HENT:      Head: Normocephalic.   Eyes:      Extraocular Movements: Extraocular movements intact.   Cardiovascular:      Rate and Rhythm: Normal rate.      Pulses: Normal pulses.   Pulmonary:      Effort:  "Pulmonary effort is normal.   Musculoskeletal:         General: Normal range of motion.      Cervical back: Normal range of motion.      Right knee: No effusion.      Instability Tests: Medial Jose test negative and lateral Jose test negative.      Left knee: No effusion.      Instability Tests: Medial Jose test negative and lateral Jose test negative.      Comments: See ortho exam   Skin:     General: Skin is warm and dry.   Neurological:      General: No focal deficit present.      Mental Status: She is alert.   Psychiatric:         Behavior: Behavior normal.         Procedures  No Procedures performed today    I have personally reviewed pertinent films in PACS.  Patient does have patella rozina bilateral knees.    Scribe Attestation      I,:  Christen Aceves am acting as a scribe while in the presence of the attending physician.:       I,:  Alfredo Small, DO personally performed the services described in this documentation    as scribed in my presence.:               Portions of the record may have been created with voice recognition software.  Occasional wrong word or \"sound a like\" substitutions may have occurred due to the inherent limitations of voice recognition software.  Read the chart carefully and recognize, using context, where substitutions have occurred.   "

## 2024-04-10 ENCOUNTER — TELEPHONE (OUTPATIENT)
Dept: PHYSICAL THERAPY | Facility: OTHER | Age: 39
End: 2024-04-10

## 2024-04-16 ENCOUNTER — HOSPITAL ENCOUNTER (OUTPATIENT)
Dept: MRI IMAGING | Facility: HOSPITAL | Age: 39
Discharge: HOME | End: 2024-04-16
Attending: ORTHOPAEDIC SURGERY
Payer: COMMERCIAL

## 2024-04-16 DIAGNOSIS — M25.562 CHRONIC PAIN OF BOTH KNEES: ICD-10-CM

## 2024-04-16 DIAGNOSIS — M25.561 CHRONIC PAIN OF BOTH KNEES: ICD-10-CM

## 2024-04-16 DIAGNOSIS — G89.29 CHRONIC PAIN OF BOTH KNEES: ICD-10-CM

## 2024-04-16 DIAGNOSIS — M71.22 BAKER'S CYST, LEFT: ICD-10-CM

## 2024-04-16 DIAGNOSIS — Q68.2 PATELLA ALTA: ICD-10-CM

## 2024-04-16 DIAGNOSIS — M71.21 BAKER'S CYST, RIGHT: ICD-10-CM

## 2024-04-16 PROCEDURE — 73721 MRI JNT OF LWR EXTRE W/O DYE: CPT

## 2024-04-29 ENCOUNTER — OFFICE VISIT (OUTPATIENT)
Dept: OBGYN CLINIC | Facility: CLINIC | Age: 39
End: 2024-04-29
Payer: COMMERCIAL

## 2024-04-29 VITALS
HEART RATE: 81 BPM | WEIGHT: 148 LBS | DIASTOLIC BLOOD PRESSURE: 66 MMHG | HEIGHT: 66 IN | BODY MASS INDEX: 23.78 KG/M2 | SYSTOLIC BLOOD PRESSURE: 98 MMHG

## 2024-04-29 DIAGNOSIS — M54.16 RADICULOPATHY, LUMBAR REGION: ICD-10-CM

## 2024-04-29 DIAGNOSIS — S74.00XA: ICD-10-CM

## 2024-04-29 DIAGNOSIS — Q68.2 PATELLA ALTA: Primary | ICD-10-CM

## 2024-04-29 PROCEDURE — 99213 OFFICE O/P EST LOW 20 MIN: CPT | Performed by: ORTHOPAEDIC SURGERY

## 2024-04-29 NOTE — PROGRESS NOTES
Assessment:  1. Patella rozina        2. Radiculopathy, lumbar region        3. Injury of sciatic nerve, unspecified laterality, initial encounter          Patient Active Problem List   Diagnosis    Bacterial vaginosis    IUD check up    LGSIL on Pap smear of cervix    Chronic pain of both knees    Baker's cyst, right    Patella rozina    Boland's cyst, left    Chronic idiopathic constipation    Radiculopathy, lumbar region    Injury of sciatic nerve       Plan:    38 y.o. female  with a patella rozina and chronic pain of the right knee complicated by lumbar radiculopathy    Discussed MRI of the right knee and ultrasound were normal.  Discussed ultrasound or MRI did not reveal a Baker's cyst as we were previously suspicious for as patient has persistent posterior knee pain.  Discussed we recommend referral to comprehensive [facility] and pain secondary to underlying radiculopathy.  Patient agreeable.  Patient expressed understanding.  Patient to follow-up with [facility] pain.      The assessment and plan were formulated by Dr. Small and I assisted in carrying it out.    Subjective:   Patient ID: Martha Clark is a 38 y.o. female .    HPI    Patient presents to the office for follow up of right knee pain.  Patient notes persistent posterior right knee pain.  Patient here to follow-up MRI of the right knee.    The following portions of the patient's history were reviewed and updated as appropriate: allergies, current medications, past family history, past social history, past surgical history and problem list.    Social History     Socioeconomic History    Marital status: Single     Spouse name: Not on file    Number of children: Not on file    Years of education: Not on file    Highest education level: Not on file   Occupational History    Not on file   Tobacco Use    Smoking status: Never    Smokeless tobacco: Never   Vaping Use    Vaping status: Never Used   Substance and Sexual Activity    Alcohol use: Yes      Comment: socially    Drug use: No    Sexual activity: Yes     Partners: Male     Birth control/protection: I.U.D.   Other Topics Concern    Not on file   Social History Narrative    Not on file     Social Determinants of Health     Financial Resource Strain: Not on file   Food Insecurity: Not on file   Transportation Needs: Not on file   Physical Activity: Not on file   Stress: Not on file   Social Connections: Not on file   Intimate Partner Violence: Not on file   Housing Stability: Not on file     Past Medical History:   Diagnosis Date    Bacterial vaginosis 04/2018     Past Surgical History:   Procedure Laterality Date    NO PAST SURGERIES       Allergies   Allergen Reactions    Sesame Seed (Diagnostic) - Food Allergy Itching and Swelling     Pt reports throat swelling and rash to back and chest     Current Outpatient Medications on File Prior to Visit   Medication Sig Dispense Refill    EPINEPHrine (EPIPEN) 0.3 mg/0.3 mL SOAJ INJECT 0.3 ML (0.3 MG TOTAL) INJECT INTO THE MUSCLE ONE TIME FOR 1 DOSE.      esomeprazole (NexIUM) 40 MG capsule Take 40 mg by mouth daily      famotidine (PEPCID) 40 MG tablet Take 1 tablet (40 mg total) by mouth daily at bedtime (Patient not taking: Reported on 3/1/2024) 30 tablet 3    fexofenadine-pseudoephedrine (ALLEGRA-D)  MG per tablet Take 1 tablet by mouth every 12 (twelve) hours 20 tablet 0    ibuprofen (MOTRIN) 600 mg tablet Take 1 tablet (600 mg total) by mouth every 6 (six) hours as needed for mild pain (Patient not taking: Reported on 10/18/2023) 20 tablet 0    lidocaine-prilocaine (EMLA) cream Apply topically as needed for mild pain (Patient not taking: Reported on 3/14/2024) 30 g 0    Polyethylene Glycol 3350 POWD Take 17 g by mouth daily (Patient not taking: Reported on 12/14/2023)       No current facility-administered medications on file prior to visit.       Review of Systems   Constitutional:  Negative for chills and fever.   HENT:  Negative for ear pain and sore  throat.    Eyes:  Negative for pain and visual disturbance.   Respiratory:  Negative for cough and shortness of breath.    Cardiovascular:  Negative for chest pain and palpitations.   Gastrointestinal:  Negative for abdominal pain and vomiting.   Genitourinary:  Negative for dysuria and hematuria.   Musculoskeletal:  Positive for arthralgias. Negative for back pain.   Skin:  Negative for color change and rash.   Neurological:  Negative for seizures and syncope.   All other systems reviewed and are negative.    See HPi    Objective:    Vitals:    04/29/24 1542   BP: 98/66   Pulse: 81       Physical Exam  Vitals and nursing note reviewed.   Constitutional:       General: She is not in acute distress.     Appearance: She is well-developed.   HENT:      Head: Normocephalic and atraumatic.   Eyes:      Conjunctiva/sclera: Conjunctivae normal.   Cardiovascular:      Rate and Rhythm: Normal rate and regular rhythm.      Heart sounds: No murmur heard.  Pulmonary:      Effort: Pulmonary effort is normal. No respiratory distress.      Breath sounds: Normal breath sounds.   Abdominal:      Palpations: Abdomen is soft.      Tenderness: There is no abdominal tenderness.   Musculoskeletal:         General: No swelling.      Cervical back: Neck supple.      Right knee: No effusion.   Skin:     General: Skin is warm and dry.      Capillary Refill: Capillary refill takes less than 2 seconds.   Neurological:      Mental Status: She is alert.   Psychiatric:         Mood and Affect: Mood normal.         Right Knee Exam     Tenderness   Right knee tenderness location: Mild posterior knee pain.    Range of Motion   Extension:  normal   Flexion:  normal     Other   Effusion: no effusion present          I have personally reviewed pertinent films in PACS and my interpretation is MRI of the right knee reveals a normal MRI without damage to the ligaments or Baker's cyst.    Procedures  No Procedures performed today     Portions of the record  "may have been created with voice recognition software.  Occasional wrong word or \"sound a like\" substitutions may have occurred due to the inherent limitations of voice recognition software.  Read the chart carefully and recognize, using context, where substitutions have occurred.   "

## 2024-05-02 ENCOUNTER — NURSE TRIAGE (OUTPATIENT)
Dept: PHYSICAL THERAPY | Facility: OTHER | Age: 39
End: 2024-05-02

## 2024-05-02 DIAGNOSIS — M54.41 CHRONIC RIGHT-SIDED LOW BACK PAIN WITH RIGHT-SIDED SCIATICA: Primary | ICD-10-CM

## 2024-05-02 DIAGNOSIS — G89.29 CHRONIC RIGHT-SIDED LOW BACK PAIN WITH RIGHT-SIDED SCIATICA: Primary | ICD-10-CM

## 2024-05-02 NOTE — TELEPHONE ENCOUNTER
Additional Information   Negative: Has the patient had unexplained weight loss?   Negative: Does the patient have a fever?   Negative: Is the patient experiencing urine retention?   Negative: Is the patient experiencing acute drop foot or paralysis?   Negative: Has the patient experienced major trauma? (fall from height, high speed collision, direct blow to spine) and is also experiencing nausea, light-headedness, or loss of consciousness?   Negative: Is the patient experiencing blood in sputum?   Affirmative: Is this a chronic condition?    Protocols used: [facility] Protocol    Patient states the pain has been present over 6 months.    [facility] was reviewed in detail and what we can provide for their back pain.  Patient is agreeable to being triaged and would like to proceed with Physical Therapy.    Referral was placed for Physical Therapy at the [facility] site. Patients information was sent to the  to make evaluation appointment. Patient made aware that the PT office  will be calling to schedule the appointment.  Patient was provided with the phone number to the PT office.    No further questions and/or concerns were voiced by the patient at this time. Patient states understanding of the referral that was placed.
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
## 2024-05-20 ENCOUNTER — OFFICE VISIT (OUTPATIENT)
Dept: OBGYN CLINIC | Facility: CLINIC | Age: 39
End: 2024-05-20
Payer: COMMERCIAL

## 2024-05-20 VITALS — BODY MASS INDEX: 23.78 KG/M2 | HEIGHT: 66 IN | WEIGHT: 148 LBS

## 2024-05-20 DIAGNOSIS — M54.10 RADICULITIS WITH LOWER EXTREMITY SYMPTOMS: Primary | ICD-10-CM

## 2024-05-20 PROCEDURE — 99213 OFFICE O/P EST LOW 20 MIN: CPT | Performed by: ORTHOPAEDIC SURGERY

## 2024-05-20 RX ORDER — METHYLPREDNISOLONE 4 MG/1
TABLET ORAL
Qty: 21 EACH | Refills: 0 | Status: SHIPPED | OUTPATIENT
Start: 2024-05-20

## 2024-05-20 RX ORDER — PSEUDOEPHED/ACETAMINOPH/DIPHEN 30MG-500MG
TABLET ORAL
COMMUNITY
Start: 2024-05-10

## 2024-05-20 RX ORDER — KETOROLAC TROMETHAMINE 10 MG/1
10 TABLET, FILM COATED ORAL EVERY 6 HOURS PRN
COMMUNITY
Start: 2024-05-10

## 2024-05-20 RX ORDER — METHOCARBAMOL 750 MG/1
TABLET, FILM COATED ORAL
COMMUNITY
Start: 2024-05-10

## 2024-05-20 NOTE — PROGRESS NOTES
Assessment:  1. Radiculitis with lower extremity symptoms          Patient Active Problem List   Diagnosis    Bacterial vaginosis    IUD check up    LGSIL on Pap smear of cervix    Chronic pain of both knees    Baker's cyst, right    Patella rozina    Boland's cyst, left    Chronic idiopathic constipation    Radiculopathy, lumbar region    Injury of sciatic nerve           Plan      38 y.o. female with bilateral radiculitis with lower extremity symptoms  Diagnostics reviewed and physical exam performed.  Diagnosis, treatment options and associated risks were discussed with the patient including no treatment, nonsurgical treatment and potential for surgical intervention.  The patient was given the opportunity to ask questions regarding each.  Patient presents with persistent posterior knee pain most consistent with lumbar pathology. MRI reviewed and does not demonstrate any abnormalities.   Recommend she initiate care with [facility] as scheduled.   Medrol Dose Jonathan prescribed to be taken if needed  Return for follow up on an as-needed basis (PRN).  I have tried palpating the posterior popliteal space hamstring and gastroc and nothing reproduce her symptoms except for the slump test.  When I took her out of the slump position and did the same thing with the lower extremity there was no pain.  Plus the MRI that was done shows no pathology in the knee not even a Baker's cyst that would cause posterior popliteal pain.  Nothing further orthopedically.          Subjective:     Patient ID: Martha Clark 38 y.o. female    HPI    Patient presents today for follow up evaluation of right knee and to review MRI results. She notes minimal change since her last visit. She has scheduled [facility] appointment but has not yet initiated treatment. She did present to the emergency room on 5/10/2024 at [facility] due to increased pain where she was prescribed Toradol and Robaxin.       The following portions of the patient's  history were reviewed and updated as appropriate: allergies, current medications, past family history, past social history, past surgical history and problem list.      Objective:    Review of Systems  Pertinent items are noted in HPI.  All other systems were reviewed and are negative.    Past Medical History:   Diagnosis Date    Bacterial vaginosis 04/2018       Past Surgical History:   Procedure Laterality Date    NO PAST SURGERIES         Family History   Problem Relation Age of Onset    Diabetes Mother     No Known Problems Father        Social History     Occupational History    Not on file   Tobacco Use    Smoking status: Never    Smokeless tobacco: Never   Vaping Use    Vaping status: Never Used   Substance and Sexual Activity    Alcohol use: Yes     Comment: socially    Drug use: No    Sexual activity: Yes     Partners: Male     Birth control/protection: I.U.D.         Current Outpatient Medications:     Acetaminophen Extra Strength 500 MG TABS, TAKE 1 TABLET BY MOUTH EVERY 6 HOURS AS NEEDED FOR MILD PAIN (PAIN SCORE 1-3)., Disp: , Rfl:     ketorolac (TORADOL) 10 mg tablet, Take 10 mg by mouth every 6 (six) hours as needed, Disp: , Rfl:     methocarbamol (ROBAXIN) 750 mg tablet, TAKE 1 TABLET (750 MG TOTAL) BY MOUTH 4 (FOUR) TIMES A DAY AS NEEDED FOR MUSCLE SPASMS., Disp: , Rfl:     EPINEPHrine (EPIPEN) 0.3 mg/0.3 mL SOAJ, INJECT 0.3 ML (0.3 MG TOTAL) INJECT INTO THE MUSCLE ONE TIME FOR 1 DOSE., Disp: , Rfl:     esomeprazole (NexIUM) 40 MG capsule, Take 40 mg by mouth daily, Disp: , Rfl:     famotidine (PEPCID) 40 MG tablet, Take 1 tablet (40 mg total) by mouth daily at bedtime (Patient not taking: Reported on 3/1/2024), Disp: 30 tablet, Rfl: 3    fexofenadine-pseudoephedrine (ALLEGRA-D)  MG per tablet, Take 1 tablet by mouth every 12 (twelve) hours, Disp: 20 tablet, Rfl: 0    ibuprofen (MOTRIN) 600 mg tablet, Take 1 tablet (600 mg total) by mouth every 6 (six) hours as needed for mild pain (Patient  "not taking: Reported on 10/18/2023), Disp: 20 tablet, Rfl: 0    lidocaine-prilocaine (EMLA) cream, Apply topically as needed for mild pain (Patient not taking: Reported on 3/14/2024), Disp: 30 g, Rfl: 0    Polyethylene Glycol 3350 POWD, Take 17 g by mouth daily (Patient not taking: Reported on 12/14/2023), Disp: , Rfl:     Allergies   Allergen Reactions    Sesame Seed (Diagnostic) - Food Allergy Itching and Swelling     Pt reports throat swelling and rash to back and chest       Physical Exam  Ht 5' 6\" (1.676 m)   Wt 67.1 kg (148 lb)   BMI 23.89 kg/m²   Cons: Appears well.  No apparent distress.  Psych: Alert. Oriented x3.  Mood and affect normal.  Eyes: PERRLA, EOMI  Resp: Normal effort.  No audible wheezing or stridor.  CV: Palpable pulse.  No discernable arrhythmia.  No LE edema.  Lymph:  No palpable cervical, axillary, or inguinal lymphadenopathy.  Skin: Warm.  No palpable masses.  No visible lesions.  Neuro: Normal muscle tone.  Normal and symmetric DTR's.    Right Knee Exam     Tenderness   The patient is experiencing no tenderness (Mild posterior knee pain).     Range of Motion   Extension:  normal   Flexion:  normal     Other   Effusion: no effusion present    Comments:    + Slump test  - ttp gastroc  -ttp popliteal space              Procedures  No Procedures performed today      I have personally reviewed pertinent films in PACS and my interpretation is MRI of right knee obtained 4/16/2024 reviewed and demonstrates: no acute injury or abnormalities .      Scribe Attestation      I,:  Christen Aceves am acting as a scribe while in the presence of the attending physician.:       I,:  Alfredo Small DO personally performed the services described in this documentation    as scribed in my presence.:                Portions of the record may have been created with voice recognition software.  Occasional wrong word or \"sound a like\" substitutions may have occurred due to the inherent limitations of voice " recognition software.  Read the chart carefully and recognize, using context, where substitutions have occurred.